# Patient Record
Sex: FEMALE | Race: WHITE | NOT HISPANIC OR LATINO | Employment: OTHER | URBAN - METROPOLITAN AREA
[De-identification: names, ages, dates, MRNs, and addresses within clinical notes are randomized per-mention and may not be internally consistent; named-entity substitution may affect disease eponyms.]

---

## 2017-01-06 ENCOUNTER — PATIENT MESSAGE (OUTPATIENT)
Dept: FAMILY MEDICINE | Facility: CLINIC | Age: 67
End: 2017-01-06

## 2017-01-06 RX ORDER — TRIAMCINOLONE ACETONIDE 5 MG/G
CREAM TOPICAL 2 TIMES DAILY
Qty: 15 G | Refills: 0 | Status: SHIPPED | OUTPATIENT
Start: 2017-01-06 | End: 2018-03-01 | Stop reason: SDUPTHER

## 2017-05-04 ENCOUNTER — PATIENT MESSAGE (OUTPATIENT)
Dept: FAMILY MEDICINE | Facility: CLINIC | Age: 67
End: 2017-05-04

## 2017-05-05 ENCOUNTER — OFFICE VISIT (OUTPATIENT)
Dept: FAMILY MEDICINE | Facility: CLINIC | Age: 67
End: 2017-05-05
Payer: MEDICARE

## 2017-05-05 VITALS
BODY MASS INDEX: 26.48 KG/M2 | WEIGHT: 158.94 LBS | TEMPERATURE: 98 F | RESPIRATION RATE: 12 BRPM | HEIGHT: 65 IN | HEART RATE: 92 BPM | SYSTOLIC BLOOD PRESSURE: 132 MMHG | DIASTOLIC BLOOD PRESSURE: 62 MMHG

## 2017-05-05 DIAGNOSIS — L25.9 CONTACT DERMATITIS, UNSPECIFIED CONTACT DERMATITIS TYPE, UNSPECIFIED TRIGGER: Primary | ICD-10-CM

## 2017-05-05 PROCEDURE — 96372 THER/PROPH/DIAG INJ SC/IM: CPT | Mod: S$GLB,,, | Performed by: NURSE PRACTITIONER

## 2017-05-05 PROCEDURE — 99214 OFFICE O/P EST MOD 30 MIN: CPT | Mod: 25,S$GLB,, | Performed by: NURSE PRACTITIONER

## 2017-05-05 RX ORDER — PREDNISONE 20 MG/1
40 TABLET ORAL DAILY
Qty: 20 TABLET | Refills: 0 | Status: SHIPPED | OUTPATIENT
Start: 2017-05-05 | End: 2017-05-15

## 2017-05-05 RX ORDER — DEXAMETHASONE SODIUM PHOSPHATE 4 MG/ML
8 INJECTION, SOLUTION INTRA-ARTICULAR; INTRALESIONAL; INTRAMUSCULAR; INTRAVENOUS; SOFT TISSUE
Status: COMPLETED | OUTPATIENT
Start: 2017-05-05 | End: 2017-05-05

## 2017-05-05 RX ADMIN — DEXAMETHASONE SODIUM PHOSPHATE 8 MG: 4 INJECTION, SOLUTION INTRA-ARTICULAR; INTRALESIONAL; INTRAMUSCULAR; INTRAVENOUS; SOFT TISSUE at 04:05

## 2017-05-05 NOTE — PATIENT INSTRUCTIONS
Poison Ivy Rash  You have a rash and itching. This is a delayed reaction to the oils of the poison ivy plant. You likely came in contact with it during the 3 days before your symptoms began. Your skin will become red and itchy. Small blisters may appear. These can break and leak a clear yellow fluid. This fluid is not contagious. The reaction usually starts to go away after 1 to 2 weeks. But it may take 4 to 6 weeks to fully clear.    Home care  Follow these guidelines when caring for yourself at home:  · The plant oils still on your skin or clothes can be spread to other places on your body. They can also be passed on to other people and cause a similar reaction. Thats why its important to wash all of the plant oils off your skin and any clothes that may have been exposed. Wash all clothes that you were wearing. Use hot water with ordinary laundry detergent.  · Don't use over-the-counter creams that have neomycin or bacitracin. These may make the rash worse.  · Avoid anything that heats up your skin. This includes hot showers or baths, or direct sunlight. These can make itching worse.  · Put a cold compress on areas that are leaking (weeping), or on blistered areas. Do this for 30 minutes 3 times a day. To make a cold compress, dip a wash cloth in a mixture of 1 pint of cold water and 1 packet of astringent or oatmeal bath powder. Keep the solution in the refrigerator for future use.  · If large areas of skin are affected, take a lukewarm bath. Add colloidal oatmeal, or 1 cup of cornstarch or baking soda to the water.  · For a rash in a smaller area, use hydrocortisone cream for redness and irritation. But dont use this if another medicine was prescribed. For severe itching, put an ice pack on the area. To make an ice pack, put ice cubes in a plastic bag that seals at the top. Wrap the bag in a clean, thin towel or cloth. Never put ice or an ice pack directly on the skin. Over-the-counter products that have  calamine lotion may also be helpful.  · You can also use an oral antihistamine medicine with diphenhydramine for itching, unless another medicine was prescribed. This medicine may make you sleepy. So use lower doses during the daytime and higher doses at bedtime. Dont use medicine that has diphenhydramine if you have glaucoma. Also dont use it if you are a man who has trouble urinating because of an enlarged prostate. Antihistamines with loratidine cause less drowsiness. They are a good choice for daytime use.  · For severe cases, your provider may prescribe oral steroid medicines. Always take these exactly as prescribed.  Follow-up care  Follow up with your healthcare provider, or as directed. Call your provider if your rash gets worse or you are not starting to get better after 1 week of treatment.  When to seek medical advice  Call your healthcare provider right away if any of these occur:  · Spreading facial rash with swollen mouth or eyelids  · Rash that spreads to the groin and causes swelling of the penis, scrotum, or vaginal area  · Trouble urinating because of swelling in the genital area  Also call your provider if you have signs of infection in the areas of broken blisters:  · Spreading redness  · Pus or fluid draining from the blisters  · Yellow-brown crusts form over the open blisters  · Fever of 1 degree, or higher, above your normal temperature, or as directed by your provider  Call 911  Call 911 if you have severe swelling on your face, eyelids, mouth, throat, or tongue.  Date Last Reviewed: 8/1/2016  © 8128-1574 [employer]. [address] [place] [address], [place] [address]. All rights reserved. This information is not intended as a substitute for professional medical care. Always follow your healthcare professional's instructions.

## 2017-05-05 NOTE — MR AVS SNAPSHOT
[facility]  [address]  [facility] [address]  Phone: [phone]  Fax: [phone]                  Kadi Blackman   2017 3:20 PM   Office Visit    Description:  Female : 1950   Provider:  Bella Molina NP   Department:  [facility]           Reason for Visit     itching rash, red all over body           Diagnoses this Visit        Comments    Contact dermatitis, unspecified contact dermatitis type, unspecified trigger    -  Primary            To Do List           Goals (5 Years of Data)     None       These Medications        Disp Refills Start End    predniSONE (DELTASONE) 20 MG tablet 20 tablet 0 2017 5/15/2017    Take 2 tablets (40 mg total) by mouth once daily. - Oral    [id number] [place] [facility] [address] #: [phone]         [facility]     [facility] Nurse Care Line -  Assistance  Unless otherwise directed by your provider, please contact [facility], our nurse care line that is available for  assistance.     Registered nurses in the [facility] Center provide: appointment scheduling, clinical advisement, health education, and other advisory services.  Call: [phone] (toll free)               Medications           Message regarding Medications     Verify the changes and/or additions to your medication regime listed below are the same as discussed with your clinician today.  If any of these changes or additions are incorrect, please notify your healthcare provider.        START taking these NEW medications        Refills    predniSONE (DELTASONE) 20 MG tablet 0    Sig: Take 2 tablets (40 mg total) by mouth once daily.    Class: Normal    Route: Oral      These medications were administered today        Dose Freq    dexamethasone injection 8 mg 8 mg Clinic/HOD 1 time    Sig: Inject 2 mLs (8 mg total) into the muscle one time.    Class: Normal     "Route: Intramuscular           Verify that the below list of medications is an accurate representation of the medications you are currently taking.  If none reported, the list may be blank. If incorrect, please contact your healthcare provider. Carry this list with you in case of emergency.           Current Medications     diphenhydrAMINE (BENADRYL) 50 MG tablet Take 50 mg by mouth nightly as needed for Itching.    fluoxetine (PROZAC) 20 MG capsule Take 1 capsule (20 mg total) by mouth once daily.    levothyroxine (SYNTHROID, LEVOTHROID) 175 MCG tablet Take 1 tablet (175 mcg total) by mouth every morning.    predniSONE (DELTASONE) 20 MG tablet Take 2 tablets (40 mg total) by mouth once daily.    triamcinolone acetonide 0.5% (KENALOG) 0.5 % Crea Apply topically 2 (two) times daily.           Clinical Reference Information           Your Vitals Were     BP Pulse Temp Resp Height Weight    132/62 92 98.3 °F (36.8 °C) (Oral) 12 5' 5" (1.651 m) 72.1 kg (158 lb 15.2 oz)    BMI                26.45 kg/m2          Blood Pressure          Most Recent Value    BP  132/62      Allergies as of 5/5/2017     No Known Allergies      Immunizations Administered on Date of Encounter - 5/5/2017     None      Instructions      Poison Coral Rash  You have a rash and itching. This is a delayed reaction to the oils of the poison ivy plant. You likely came in contact with it during the 3 days before your symptoms began. Your skin will become red and itchy. Small blisters may appear. These can break and leak a clear yellow fluid. This fluid is not contagious. The reaction usually starts to go away after 1 to 2 weeks. But it may take 4 to 6 weeks to fully clear.    Home care  Follow these guidelines when caring for yourself at home:  · The plant oils still on your skin or clothes can be spread to other places on your body. They can also be passed on to other people and cause a similar reaction. Thats why its important to wash all of the " plant oils off your skin and any clothes that may have been exposed. Wash all clothes that you were wearing. Use hot water with ordinary laundry detergent.  · Don't use over-the-counter creams that have neomycin or bacitracin. These may make the rash worse.  · Avoid anything that heats up your skin. This includes hot showers or baths, or direct sunlight. These can make itching worse.  · Put a cold compress on areas that are leaking (weeping), or on blistered areas. Do this for 30 minutes 3 times a day. To make a cold compress, dip a wash cloth in a mixture of 1 pint of cold water and 1 packet of astringent or oatmeal bath powder. Keep the solution in the refrigerator for future use.  · If large areas of skin are affected, take a lukewarm bath. Add colloidal oatmeal, or 1 cup of cornstarch or baking soda to the water.  · For a rash in a smaller area, use hydrocortisone cream for redness and irritation. But dont use this if another medicine was prescribed. For severe itching, put an ice pack on the area. To make an ice pack, put ice cubes in a plastic bag that seals at the top. Wrap the bag in a clean, thin towel or cloth. Never put ice or an ice pack directly on the skin. Over-the-counter products that have calamine lotion may also be helpful.  · You can also use an oral antihistamine medicine with diphenhydramine for itching, unless another medicine was prescribed. This medicine may make you sleepy. So use lower doses during the daytime and higher doses at bedtime. Dont use medicine that has diphenhydramine if you have glaucoma. Also dont use it if you are a man who has trouble urinating because of an enlarged prostate. Antihistamines with loratidine cause less drowsiness. They are a good choice for daytime use.  · For severe cases, your provider may prescribe oral steroid medicines. Always take these exactly as prescribed.  Follow-up care  Follow up with your healthcare provider, or as directed. Call your  provider if your rash gets worse or you are not starting to get better after 1 week of treatment.  When to seek medical advice  Call your healthcare provider right away if any of these occur:  · Spreading facial rash with swollen mouth or eyelids  · Rash that spreads to the groin and causes swelling of the penis, scrotum, or vaginal area  · Trouble urinating because of swelling in the genital area  Also call your provider if you have signs of infection in the areas of broken blisters:  · Spreading redness  · Pus or fluid draining from the blisters  · Yellow-brown crusts form over the open blisters  · Fever of 1 degree, or higher, above your normal temperature, or as directed by your provider  Call 911  Call 911 if you have severe swelling on your face, eyelids, mouth, throat, or tongue.  Date Last Reviewed: 8/1/2016 © 2000-2016 [employer]. [address] [place] [address]. All rights reserved. This information is not intended as a substitute for professional medical care. Always follow your healthcare professional's instructions.             Language Assistance Services     ATTENTION: Language assistance services are available, free of charge. Please call [phone].      ATENCIÓN: Si habla español, tiene a hansen disposición servicios gratuitos de asistencia lingüística. Llame al [phone].     CHÚ Ý: N?u b?n nói Ti?ng Vi?t, có các d?ch v? h? tr? ngôn ng? mi?n phí dành cho b?n. G?i s? [phone].         [facility] complies with applicable Federal civil rights laws and does not discriminate on the basis of race, color, national origin, age, disability, or sex.

## 2017-05-07 NOTE — PROGRESS NOTES
"Subjective:       Patient ID: Kadi Blackman is a 67 y.o. female.    Chief Complaint: itching rash, red all over body    Rash   This is a new problem. The current episode started in the past 7 days. The affected locations include the chest (upper extremities). The rash is characterized by blistering and redness. She was exposed to nothing. Pertinent negatives include no anorexia, congestion, cough, diarrhea, eye pain, facial edema, fatigue, fever, joint pain, nail changes, rhinorrhea, shortness of breath, sore throat or vomiting. Past treatments include anti-itch cream. The treatment provided no relief.     Review of Systems   Constitutional: Negative for fatigue and fever.   HENT: Negative for congestion, rhinorrhea and sore throat.    Eyes: Negative for pain.   Respiratory: Negative for cough and shortness of breath.    Gastrointestinal: Negative for anorexia, diarrhea and vomiting.   Musculoskeletal: Negative for joint pain.   Skin: Positive for rash. Negative for nail changes.       Objective:       Vitals:    05/05/17 1533   BP: 132/62   Pulse: 92   Resp: 12   Temp: 98.3 °F (36.8 °C)   TempSrc: Oral   Weight: 72.1 kg (158 lb 15.2 oz)   Height: 5' 5" (1.651 m)   PainSc: 0-No pain   PainLoc: Generalized       Physical Exam   Constitutional: She is oriented to person, place, and time. She appears well-developed and well-nourished.   HENT:   Head: Normocephalic and atraumatic.   Right Ear: External ear normal.   Left Ear: External ear normal.   Nose: Nose normal.   Mouth/Throat: Oropharynx is clear and moist.   Eyes: Conjunctivae are normal. Right eye exhibits no discharge. Left eye exhibits no discharge. No scleral icterus.   Neck: Normal range of motion. Neck supple. No tracheal deviation present.   Cardiovascular: Normal rate, regular rhythm and normal heart sounds.  Exam reveals no friction rub.    No murmur heard.  Pulmonary/Chest: Effort normal and breath sounds normal. No stridor. No respiratory " distress. She has no wheezes. She has no rales. She exhibits no tenderness.   Musculoskeletal: Normal range of motion.   Lymphadenopathy:     She has no cervical adenopathy.   Neurological: She is alert and oriented to person, place, and time.   Skin: Skin is warm and dry.   There are scattered erythematous papules to the arms/chest. The arms have severe erythema from the wrist to the upper arm.    Psychiatric: She has a normal mood and affect.       Assessment:       1. Contact dermatitis, unspecified contact dermatitis type, unspecified trigger        Plan:       Kadi was seen today for itching rash, red all over body.    Diagnoses and all orders for this visit:    Contact dermatitis, unspecified contact dermatitis type, unspecified trigger  -     dexamethasone injection 8 mg; Inject 2 mLs (8 mg total) into the muscle one time.  -     predniSONE (DELTASONE) 20 MG tablet; Take 2 tablets (40 mg total) by mouth once daily.

## 2017-05-30 ENCOUNTER — PATIENT MESSAGE (OUTPATIENT)
Dept: FAMILY MEDICINE | Facility: CLINIC | Age: 67
End: 2017-05-30

## 2017-09-05 ENCOUNTER — PATIENT MESSAGE (OUTPATIENT)
Dept: FAMILY MEDICINE | Facility: CLINIC | Age: 67
End: 2017-09-05

## 2017-09-05 RX ORDER — LEVOTHYROXINE SODIUM 175 UG/1
175 TABLET ORAL EVERY MORNING
Qty: 90 TABLET | Refills: 3 | Status: SHIPPED | OUTPATIENT
Start: 2017-09-05 | End: 2018-10-10

## 2017-09-29 DIAGNOSIS — Z12.11 COLON CANCER SCREENING: ICD-10-CM

## 2017-10-04 ENCOUNTER — CLINICAL SUPPORT (OUTPATIENT)
Dept: FAMILY MEDICINE | Facility: CLINIC | Age: 67
End: 2017-10-04
Payer: MEDICARE

## 2018-03-01 RX ORDER — TRIAMCINOLONE ACETONIDE 5 MG/G
CREAM TOPICAL 2 TIMES DAILY
Qty: 15 G | Refills: 0 | Status: SHIPPED | OUTPATIENT
Start: 2018-03-01 | End: 2018-10-09 | Stop reason: SDUPTHER

## 2018-05-09 ENCOUNTER — PATIENT OUTREACH (OUTPATIENT)
Dept: ADMINISTRATIVE | Facility: HOSPITAL | Age: 68
End: 2018-05-09

## 2018-05-09 NOTE — PROGRESS NOTES
Health Maintenance Due   Topic Date Due    Colonoscopy  01/16/1968    Lipid Panel  01/19/2017    Mammogram  01/20/2017

## 2018-09-12 ENCOUNTER — PATIENT MESSAGE (OUTPATIENT)
Dept: FAMILY MEDICINE | Facility: CLINIC | Age: 68
End: 2018-09-12

## 2018-09-12 DIAGNOSIS — E03.9 HYPOTHYROIDISM, UNSPECIFIED TYPE: ICD-10-CM

## 2018-09-12 DIAGNOSIS — E78.5 HYPERLIPIDEMIA, UNSPECIFIED HYPERLIPIDEMIA TYPE: ICD-10-CM

## 2018-09-12 DIAGNOSIS — Z00.00 PREVENTATIVE HEALTH CARE: Primary | ICD-10-CM

## 2018-10-02 DIAGNOSIS — Z12.11 COLON CANCER SCREENING: ICD-10-CM

## 2018-10-08 ENCOUNTER — LAB VISIT (OUTPATIENT)
Dept: LAB | Facility: HOSPITAL | Age: 68
End: 2018-10-08
Attending: NURSE PRACTITIONER
Payer: MEDICARE

## 2018-10-08 DIAGNOSIS — Z00.00 PREVENTATIVE HEALTH CARE: ICD-10-CM

## 2018-10-08 DIAGNOSIS — E78.5 HYPERLIPIDEMIA, UNSPECIFIED HYPERLIPIDEMIA TYPE: ICD-10-CM

## 2018-10-08 DIAGNOSIS — E03.9 HYPOTHYROIDISM, UNSPECIFIED TYPE: ICD-10-CM

## 2018-10-08 LAB
ALBUMIN SERPL BCP-MCNC: 4 G/DL
ALP SERPL-CCNC: 93 U/L
ALT SERPL W/O P-5'-P-CCNC: 21 U/L
ANION GAP SERPL CALC-SCNC: 8 MMOL/L
AST SERPL-CCNC: 32 U/L
BILIRUB SERPL-MCNC: 0.5 MG/DL
BUN SERPL-MCNC: 8 MG/DL
CALCIUM SERPL-MCNC: 10 MG/DL
CHLORIDE SERPL-SCNC: 105 MMOL/L
CHOLEST SERPL-MCNC: 223 MG/DL
CHOLEST/HDLC SERPL: 3.1 {RATIO}
CO2 SERPL-SCNC: 26 MMOL/L
CREAT SERPL-MCNC: 0.8 MG/DL
EST. GFR  (AFRICAN AMERICAN): >60 ML/MIN/1.73 M^2
EST. GFR  (NON AFRICAN AMERICAN): >60 ML/MIN/1.73 M^2
GLUCOSE SERPL-MCNC: 86 MG/DL
HDLC SERPL-MCNC: 72 MG/DL
HDLC SERPL: 32.3 %
LDLC SERPL CALC-MCNC: 137.8 MG/DL
NONHDLC SERPL-MCNC: 151 MG/DL
POTASSIUM SERPL-SCNC: 4.4 MMOL/L
PROT SERPL-MCNC: 7.2 G/DL
SODIUM SERPL-SCNC: 139 MMOL/L
T4 FREE SERPL-MCNC: 1.25 NG/DL
TRIGL SERPL-MCNC: 66 MG/DL
TSH SERPL DL<=0.005 MIU/L-ACNC: 0.02 UIU/ML

## 2018-10-08 PROCEDURE — 84443 ASSAY THYROID STIM HORMONE: CPT

## 2018-10-08 PROCEDURE — 36415 COLL VENOUS BLD VENIPUNCTURE: CPT | Mod: PN

## 2018-10-08 PROCEDURE — 80061 LIPID PANEL: CPT

## 2018-10-08 PROCEDURE — 80053 COMPREHEN METABOLIC PANEL: CPT

## 2018-10-08 PROCEDURE — 84439 ASSAY OF FREE THYROXINE: CPT

## 2018-10-09 ENCOUNTER — PATIENT MESSAGE (OUTPATIENT)
Dept: FAMILY MEDICINE | Facility: CLINIC | Age: 68
End: 2018-10-09

## 2018-10-09 RX ORDER — LEVOTHYROXINE SODIUM 175 UG/1
175 TABLET ORAL EVERY MORNING
Qty: 90 TABLET | Refills: 3 | Status: CANCELLED | OUTPATIENT
Start: 2018-10-09

## 2018-10-10 RX ORDER — LEVOTHYROXINE SODIUM 150 UG/1
150 TABLET ORAL DAILY
Qty: 90 TABLET | Refills: 0 | Status: SHIPPED | OUTPATIENT
Start: 2018-10-10 | End: 2019-01-05 | Stop reason: SDUPTHER

## 2018-10-10 RX ORDER — TRIAMCINOLONE ACETONIDE 5 MG/G
CREAM TOPICAL 2 TIMES DAILY
Qty: 15 G | Refills: 0 | Status: SHIPPED | OUTPATIENT
Start: 2018-10-10 | End: 2019-03-16 | Stop reason: SDUPTHER

## 2018-10-10 NOTE — TELEPHONE ENCOUNTER
Her TSH was actually low which shows an overactive thyroid.  I have reduced her Synthroid to 150 mcg.  She should follow up with her new provider to have a TSH in the next 6 weeks.

## 2019-01-02 ENCOUNTER — PATIENT MESSAGE (OUTPATIENT)
Dept: ADMINISTRATIVE | Facility: HOSPITAL | Age: 69
End: 2019-01-02

## 2019-01-02 ENCOUNTER — PATIENT OUTREACH (OUTPATIENT)
Dept: ADMINISTRATIVE | Facility: HOSPITAL | Age: 69
End: 2019-01-02

## 2019-01-05 RX ORDER — LEVOTHYROXINE SODIUM 150 UG/1
150 TABLET ORAL DAILY
Qty: 90 TABLET | Refills: 0 | Status: SHIPPED | OUTPATIENT
Start: 2019-01-05 | End: 2019-04-05 | Stop reason: SDUPTHER

## 2019-01-31 ENCOUNTER — TELEPHONE (OUTPATIENT)
Dept: FAMILY MEDICINE | Facility: CLINIC | Age: 69
End: 2019-01-31

## 2019-01-31 NOTE — TELEPHONE ENCOUNTER
Called to advise patient need to reschedule,lmor.  done in error. Called back to advise of my mistake, left another voicemail

## 2019-02-07 ENCOUNTER — OFFICE VISIT (OUTPATIENT)
Dept: FAMILY MEDICINE | Facility: CLINIC | Age: 69
End: 2019-02-07
Payer: MEDICARE

## 2019-02-07 ENCOUNTER — LAB VISIT (OUTPATIENT)
Dept: LAB | Facility: HOSPITAL | Age: 69
End: 2019-02-07
Attending: INTERNAL MEDICINE
Payer: MEDICARE

## 2019-02-07 VITALS
BODY MASS INDEX: 26.7 KG/M2 | SYSTOLIC BLOOD PRESSURE: 120 MMHG | DIASTOLIC BLOOD PRESSURE: 70 MMHG | HEART RATE: 76 BPM | WEIGHT: 160.25 LBS | HEIGHT: 65 IN

## 2019-02-07 DIAGNOSIS — F32.A DEPRESSION, UNSPECIFIED DEPRESSION TYPE: ICD-10-CM

## 2019-02-07 DIAGNOSIS — E03.9 HYPOTHYROIDISM, UNSPECIFIED TYPE: ICD-10-CM

## 2019-02-07 DIAGNOSIS — Z12.39 SCREENING FOR MALIGNANT NEOPLASM OF BREAST: ICD-10-CM

## 2019-02-07 DIAGNOSIS — M85.80 OSTEOPENIA, UNSPECIFIED LOCATION: ICD-10-CM

## 2019-02-07 DIAGNOSIS — E78.5 HYPERLIPIDEMIA, UNSPECIFIED HYPERLIPIDEMIA TYPE: ICD-10-CM

## 2019-02-07 DIAGNOSIS — E03.9 HYPOTHYROIDISM, UNSPECIFIED TYPE: Primary | ICD-10-CM

## 2019-02-07 LAB
T4 FREE SERPL-MCNC: 1.48 NG/DL
TSH SERPL DL<=0.005 MIU/L-ACNC: 0.04 UIU/ML

## 2019-02-07 PROCEDURE — 99213 OFFICE O/P EST LOW 20 MIN: CPT | Mod: PBBFAC,PN | Performed by: INTERNAL MEDICINE

## 2019-02-07 PROCEDURE — 84443 ASSAY THYROID STIM HORMONE: CPT

## 2019-02-07 PROCEDURE — 99214 PR OFFICE/OUTPT VISIT, EST, LEVL IV, 30-39 MIN: ICD-10-PCS | Mod: S$PBB,,, | Performed by: INTERNAL MEDICINE

## 2019-02-07 PROCEDURE — 99999 PR PBB SHADOW E&M-EST. PATIENT-LVL III: CPT | Mod: PBBFAC,,, | Performed by: INTERNAL MEDICINE

## 2019-02-07 PROCEDURE — 99999 PR PBB SHADOW E&M-EST. PATIENT-LVL III: ICD-10-PCS | Mod: PBBFAC,,, | Performed by: INTERNAL MEDICINE

## 2019-02-07 PROCEDURE — 36415 COLL VENOUS BLD VENIPUNCTURE: CPT | Mod: PN

## 2019-02-07 PROCEDURE — 84439 ASSAY OF FREE THYROXINE: CPT

## 2019-02-07 PROCEDURE — 99214 OFFICE O/P EST MOD 30 MIN: CPT | Mod: S$PBB,,, | Performed by: INTERNAL MEDICINE

## 2019-02-07 NOTE — PROGRESS NOTES
Assessment and Plan:    1. Hypothyroidism, unspecified type  Due for follow up labs since dose of levothyroxine decreased from 175 mcg to 150 mcg. No symptoms at this time.   - TSH; Future  - T4, free; Future    2. Depression, unspecified depression type  Mood has been good lately. Had been on fluoxetine in the past and no problems with this. Not on medication currently.     3. Hyperlipidemia, unspecified hyperlipidemia type  Overall low risk of heart attack and stroke. Continue to monitor, no medications indicated at this time.     4. Osteopenia, unspecified location  Osteopenia with low risk of fracture on last DEXA. Discussed eating foods rich in calcium and considering vitamin D supplement. Increase weight bearing activity.     5. Screening for malignant neoplasm of breast  - Mammo Digital Screening Bilat; Future      Colon cancer screening- Has FitKit at home, reminded to complete this and bring it in.   Mammo- ordered  Flu- declined  ______________________________________________________________________  Subjective:    Chief Complaint:  Establish care, review chronic medical conditions    HPI:  Kadi is a 69 y.o. year old woman here to establish care and review chronic medical conditions.     Hypothyroidism- Taking levothyroxine 150 mcg daily since labs in October when this was lowered from 175 mcg.     Depression- Was fluoxetine 20 mg daily in the past but has not been on this for more than a year. Mood has been fine without this.     HLD- Has never been on medications for this, labs have been borderline. Improved last year, but patient is not sure what would have changes. She has not changed her diet or been exercising more in particular. She has always not really eaten meat, eats more of a plant based diet (vegan). Has not been exercising much.     Osteopenia- Osteopenia with low risk of fracture noted on previous DEXA.     Past Medical History:  Past Medical History:   Diagnosis Date    Depression      Hyperlipidemia     Osteopenia     Thyroid disease        Past Surgical History:  Past Surgical History:   Procedure Laterality Date    NO PAST SURGERIES         Family History:  Family History   Problem Relation Age of Onset    Cancer Mother         stomach    Cancer Sister         ovarian    Hyperlipidemia Father     Hyperlipidemia Brother     Heart disease Paternal Grandfather     Diabetes Neg Hx        Social History:  Social History     Socioeconomic History    Marital status: Single     Spouse name: None    Number of children: None    Years of education: None    Highest education level: None   Social Needs    Financial resource strain: None    Food insecurity - worry: None    Food insecurity - inability: None    Transportation needs - medical: None    Transportation needs - non-medical: None   Occupational History    None   Tobacco Use    Smoking status: Former Smoker    Smokeless tobacco: Never Used    Tobacco comment: very light smoker in her teens   Substance and Sexual Activity    Alcohol use: No    Drug use: No     Comment: Former light MJ user    Sexual activity: Not Currently   Other Topics Concern    None   Social History Narrative    Retired, former . Still involved with a small photography business.        Medications:  Current Outpatient Medications on File Prior to Visit   Medication Sig Dispense Refill    diphenhydrAMINE (BENADRYL) 50 MG tablet Take 50 mg by mouth nightly as needed for Itching.      levothyroxine (SYNTHROID) 150 MCG tablet TAKE 1 TABLET (150 MCG TOTAL) BY MOUTH ONCE DAILY. 90 tablet 0    triamcinolone acetonide 0.5% (KENALOG) 0.5 % Crea Apply topically 2 (two) times daily. for 10 days 15 g 0    [DISCONTINUED] fluoxetine (PROZAC) 20 MG capsule Take 1 capsule (20 mg total) by mouth once daily. 90 capsule 3     No current facility-administered medications on file prior to visit.        Allergies:  Patient has no known  "allergies.    Immunizations:  Immunization History   Administered Date(s) Administered    Pneumococcal Conjugate - 13 Valent 01/26/2016    Pneumococcal Polysaccharide - 23 Valent 07/28/2014    Tdap 01/26/2016    Zoster 09/04/2014       Review of Systems:  Review of Systems   Constitutional: Negative for activity change and unexpected weight change.   HENT: Negative for hearing loss, rhinorrhea and trouble swallowing.    Eyes: Negative for discharge and visual disturbance.   Respiratory: Negative for chest tightness and wheezing.    Cardiovascular: Negative for chest pain and palpitations.   Gastrointestinal: Negative for blood in stool, constipation, diarrhea and vomiting.   Endocrine: Negative for polydipsia and polyuria.   Genitourinary: Negative for difficulty urinating, dysuria, hematuria and menstrual problem.   Musculoskeletal: Negative for arthralgias, joint swelling and neck pain.   Neurological: Negative for weakness and headaches.   Psychiatric/Behavioral: Negative for confusion and dysphoric mood.     Entered by patient and reviewed and updated during visit      Objective:    Vitals:  Vitals:    02/07/19 1326   BP: 120/70   Pulse: 76   Weight: 72.7 kg (160 lb 4.4 oz)   Height: 5' 5" (1.651 m)   PainSc: 0-No pain       Physical Exam   Constitutional: She is oriented to person, place, and time. She appears well-developed and well-nourished. No distress.   HENT:   Mouth/Throat: Oropharynx is clear and moist.   Eyes: No scleral icterus.   Cardiovascular: Normal rate and regular rhythm.   No murmur heard.  Pulmonary/Chest: Effort normal and breath sounds normal. No respiratory distress. She has no wheezes.   Musculoskeletal: She exhibits no edema.   Neurological: She is alert and oriented to person, place, and time.   Skin: Skin is warm and dry.   some hemosiderin deposition over bilateral ankles   Psychiatric: She has a normal mood and affect. Her behavior is normal.   Vitals " reviewed.      Data:  Previous labs reviewed and pertinent for low TSH with normal FT4 in October.        Saskia Cheema MD  Internal Medicine

## 2019-03-16 RX ORDER — TRIAMCINOLONE ACETONIDE 5 MG/G
CREAM TOPICAL 2 TIMES DAILY
Qty: 15 G | Refills: 0 | Status: SHIPPED | OUTPATIENT
Start: 2019-03-16 | End: 2020-07-07

## 2019-04-05 RX ORDER — LEVOTHYROXINE SODIUM 150 UG/1
150 TABLET ORAL DAILY
Qty: 90 TABLET | Refills: 0 | Status: SHIPPED | OUTPATIENT
Start: 2019-04-05 | End: 2019-04-22 | Stop reason: SDUPTHER

## 2019-04-10 ENCOUNTER — PATIENT OUTREACH (OUTPATIENT)
Dept: ADMINISTRATIVE | Facility: HOSPITAL | Age: 69
End: 2019-04-10

## 2019-04-20 ENCOUNTER — PATIENT MESSAGE (OUTPATIENT)
Dept: FAMILY MEDICINE | Facility: CLINIC | Age: 69
End: 2019-04-20

## 2019-04-22 DIAGNOSIS — E03.9 HYPOTHYROIDISM, UNSPECIFIED TYPE: Primary | ICD-10-CM

## 2019-04-22 RX ORDER — LEVOTHYROXINE SODIUM 150 UG/1
150 TABLET ORAL DAILY
Qty: 90 TABLET | Refills: 0 | Status: SHIPPED | OUTPATIENT
Start: 2019-04-22 | End: 2019-06-14 | Stop reason: ALTCHOICE

## 2019-04-22 NOTE — TELEPHONE ENCOUNTER
"Called and spoke with pt. Pt was notified of rx refill and that she was due for repeat labs. Pt stated "I have a lot going on right now, because I am going on vacation. I will call back and schedule my labs." I voiced understanding.   "

## 2019-06-13 ENCOUNTER — LAB VISIT (OUTPATIENT)
Dept: LAB | Facility: HOSPITAL | Age: 69
End: 2019-06-13
Attending: INTERNAL MEDICINE
Payer: MEDICARE

## 2019-06-13 DIAGNOSIS — Z12.39 SCREENING FOR MALIGNANT NEOPLASM OF BREAST: ICD-10-CM

## 2019-06-13 DIAGNOSIS — Z78.0 ASYMPTOMATIC POSTMENOPAUSAL STATE: Primary | ICD-10-CM

## 2019-06-13 DIAGNOSIS — E03.9 HYPOTHYROIDISM, UNSPECIFIED TYPE: ICD-10-CM

## 2019-06-13 LAB
T4 FREE SERPL-MCNC: 1.27 NG/DL (ref 0.71–1.51)
TSH SERPL DL<=0.005 MIU/L-ACNC: 0.05 UIU/ML (ref 0.4–4)

## 2019-06-13 PROCEDURE — 84439 ASSAY OF FREE THYROXINE: CPT

## 2019-06-13 PROCEDURE — 84443 ASSAY THYROID STIM HORMONE: CPT

## 2019-06-13 PROCEDURE — 36415 COLL VENOUS BLD VENIPUNCTURE: CPT | Mod: PN

## 2019-06-14 ENCOUNTER — TELEPHONE (OUTPATIENT)
Dept: FAMILY MEDICINE | Facility: CLINIC | Age: 69
End: 2019-06-14

## 2019-06-14 DIAGNOSIS — E03.9 HYPOTHYROIDISM, UNSPECIFIED TYPE: Primary | ICD-10-CM

## 2019-06-14 RX ORDER — LEVOTHYROXINE SODIUM 137 UG/1
137 TABLET ORAL
Qty: 30 TABLET | Refills: 2 | Status: SHIPPED | OUTPATIENT
Start: 2019-06-14 | End: 2019-09-13 | Stop reason: SDUPTHER

## 2019-06-14 NOTE — TELEPHONE ENCOUNTER
Please call patient about thyroid labs. The labs show that the levothyroxine dose is still too high. Ideally, I would like to decrease the dose to 137 mcg. Please confirm allergies and pharmacy and send back to me.

## 2019-09-13 DIAGNOSIS — E03.9 HYPOTHYROIDISM, UNSPECIFIED TYPE: ICD-10-CM

## 2019-09-13 RX ORDER — LEVOTHYROXINE SODIUM 137 UG/1
TABLET ORAL
Qty: 30 TABLET | Refills: 2 | Status: SHIPPED | OUTPATIENT
Start: 2019-09-13 | End: 2020-01-13 | Stop reason: SDUPTHER

## 2019-09-13 NOTE — TELEPHONE ENCOUNTER
Please call patient to schedule recheck TSH (previously ordered) so we can make sure this is the right dose for her.

## 2019-09-16 ENCOUNTER — PATIENT MESSAGE (OUTPATIENT)
Dept: FAMILY MEDICINE | Facility: CLINIC | Age: 69
End: 2019-09-16

## 2019-10-07 DIAGNOSIS — Z12.11 COLON CANCER SCREENING: ICD-10-CM

## 2019-12-10 DIAGNOSIS — E03.9 HYPOTHYROIDISM, UNSPECIFIED TYPE: ICD-10-CM

## 2019-12-10 NOTE — TELEPHONE ENCOUNTER
Called pt in regards her labs and a follow up appt. No answer. LVM to return phone call to clinic.

## 2019-12-10 NOTE — TELEPHONE ENCOUNTER
Please call patient. She is due for repeat TSH and did not read the portal message she had been sent about this in Sept.

## 2019-12-13 NOTE — TELEPHONE ENCOUNTER
2nd attempt to contact pt. No answer. LVM to return phone call to clinic to be scheduled for labs and follow up appt.

## 2019-12-18 RX ORDER — LEVOTHYROXINE SODIUM 137 UG/1
TABLET ORAL
Qty: 30 TABLET | Refills: 2 | OUTPATIENT
Start: 2019-12-18

## 2020-01-06 ENCOUNTER — PATIENT MESSAGE (OUTPATIENT)
Dept: FAMILY MEDICINE | Facility: CLINIC | Age: 70
End: 2020-01-06

## 2020-01-10 ENCOUNTER — LAB VISIT (OUTPATIENT)
Dept: LAB | Facility: HOSPITAL | Age: 70
End: 2020-01-10
Attending: INTERNAL MEDICINE
Payer: MEDICARE

## 2020-01-10 ENCOUNTER — PATIENT MESSAGE (OUTPATIENT)
Dept: FAMILY MEDICINE | Facility: CLINIC | Age: 70
End: 2020-01-10

## 2020-01-10 DIAGNOSIS — E03.9 HYPOTHYROIDISM, UNSPECIFIED TYPE: ICD-10-CM

## 2020-01-10 PROCEDURE — 36415 COLL VENOUS BLD VENIPUNCTURE: CPT | Mod: PN

## 2020-01-10 PROCEDURE — 84443 ASSAY THYROID STIM HORMONE: CPT

## 2020-01-11 LAB — TSH SERPL DL<=0.005 MIU/L-ACNC: 0.98 UIU/ML (ref 0.4–4)

## 2020-01-13 ENCOUNTER — TELEPHONE (OUTPATIENT)
Dept: FAMILY MEDICINE | Facility: CLINIC | Age: 70
End: 2020-01-13

## 2020-01-13 DIAGNOSIS — E03.9 HYPOTHYROIDISM, UNSPECIFIED TYPE: ICD-10-CM

## 2020-01-13 RX ORDER — LEVOTHYROXINE SODIUM 137 UG/1
137 TABLET ORAL
Qty: 90 TABLET | Refills: 2 | Status: SHIPPED | OUTPATIENT
Start: 2020-01-13 | End: 2020-09-20

## 2020-01-13 NOTE — TELEPHONE ENCOUNTER
Thyroid lab is in the normal range, please let her know that I have sent the refills to her preferred pharmacy.

## 2020-07-07 ENCOUNTER — OFFICE VISIT (OUTPATIENT)
Dept: FAMILY MEDICINE | Facility: CLINIC | Age: 70
End: 2020-07-07
Payer: MEDICARE

## 2020-07-07 VITALS
RESPIRATION RATE: 18 BRPM | DIASTOLIC BLOOD PRESSURE: 84 MMHG | SYSTOLIC BLOOD PRESSURE: 136 MMHG | WEIGHT: 144.31 LBS | OXYGEN SATURATION: 99 % | TEMPERATURE: 98 F | BODY MASS INDEX: 24.04 KG/M2 | HEART RATE: 76 BPM | HEIGHT: 65 IN

## 2020-07-07 DIAGNOSIS — L23.7 ALLERGIC DERMATITIS DUE TO POISON IVY: Primary | ICD-10-CM

## 2020-07-07 PROCEDURE — 99999 PR PBB SHADOW E&M-EST. PATIENT-LVL V: CPT | Mod: PBBFAC,,, | Performed by: INTERNAL MEDICINE

## 2020-07-07 PROCEDURE — 96372 THER/PROPH/DIAG INJ SC/IM: CPT | Mod: PBBFAC,PN

## 2020-07-07 PROCEDURE — 99214 PR OFFICE/OUTPT VISIT, EST, LEVL IV, 30-39 MIN: ICD-10-PCS | Mod: S$PBB,,, | Performed by: INTERNAL MEDICINE

## 2020-07-07 PROCEDURE — 99214 OFFICE O/P EST MOD 30 MIN: CPT | Mod: S$PBB,,, | Performed by: INTERNAL MEDICINE

## 2020-07-07 PROCEDURE — 99215 OFFICE O/P EST HI 40 MIN: CPT | Mod: PBBFAC,PN | Performed by: INTERNAL MEDICINE

## 2020-07-07 PROCEDURE — 99999 PR PBB SHADOW E&M-EST. PATIENT-LVL V: ICD-10-PCS | Mod: PBBFAC,,, | Performed by: INTERNAL MEDICINE

## 2020-07-07 RX ORDER — HYDROXYZINE HYDROCHLORIDE 25 MG/1
25-50 TABLET, FILM COATED ORAL 3 TIMES DAILY PRN
Qty: 30 TABLET | Refills: 0 | Status: SHIPPED | OUTPATIENT
Start: 2020-07-07 | End: 2021-08-10

## 2020-07-07 RX ORDER — PREDNISONE 10 MG/1
TABLET ORAL
Qty: 31 TABLET | Refills: 0 | Status: SHIPPED | OUTPATIENT
Start: 2020-07-07 | End: 2021-08-10

## 2020-07-07 RX ORDER — TRIAMCINOLONE ACETONIDE 1 MG/G
OINTMENT TOPICAL 2 TIMES DAILY
Qty: 30 G | Refills: 2 | Status: SHIPPED | OUTPATIENT
Start: 2020-07-07 | End: 2022-08-26

## 2020-07-07 RX ORDER — BETAMETHASONE SODIUM PHOSPHATE AND BETAMETHASONE ACETATE 3; 3 MG/ML; MG/ML
6 INJECTION, SUSPENSION INTRA-ARTICULAR; INTRALESIONAL; INTRAMUSCULAR; SOFT TISSUE ONCE
Status: COMPLETED | OUTPATIENT
Start: 2020-07-07 | End: 2020-07-07

## 2020-07-07 RX ADMIN — BETAMETHASONE ACETATE AND BETAMETHASONE SODIUM PHOSPHATE 6 MG: 3; 3 INJECTION, SUSPENSION INTRA-ARTICULAR; INTRALESIONAL; INTRAMUSCULAR; SOFT TISSUE at 02:07

## 2020-07-07 NOTE — PATIENT INSTRUCTIONS
Prednisone:    Day 1: 40 mg  Day 2: 40 mg  Day 3: 40 mg  Day 4: 30 mg  Day 5: 30 mg  Day 6: 30 mg  Day 7: 20 mg  Day 8: 20 mg  Day 9: 20 mg  Day 10: 10 mg  Day 11: 10 mg  Day 12: 10 mg  Day 13: 5 mg  Day 14: 5 mg    Cerave or Cetaphil cream are the moisturizers we discussed.

## 2020-07-07 NOTE — PROGRESS NOTES
Assessment and Plan:    1. Allergic dermatitis due to poison ivy  Severe itching and extensive rash due to likely poison ivy. Discussed prolonged steroid taper given severity of symptoms. Discussed additional symptomatic management. Patient in agreement and all questions answered.   - predniSONE (DELTASONE) 10 MG tablet; Taper as directed over 14 days  Dispense: 31 tablet; Refill: 0  - hydrOXYzine HCL (ATARAX) 25 MG tablet; Take 1-2 tablets (25-50 mg total) by mouth 3 (three) times daily as needed for Itching.  Dispense: 30 tablet; Refill: 0  - triamcinolone acetonide 0.1% (KENALOG) 0.1 % ointment; Apply topically 2 (two) times daily.  Dispense: 30 g; Refill: 2  - betamethasone acetate-betamethasone sodium phosphate injection 6 mg    ______________________________________________________________________  Subjective:    Chief Complaint:  Itching and rash    HPI:  Kadi is a 70 y.o. year old female here for evaluation of itching and rash    She reports that she had onset of diffuse redness and itching after she had been in her garden and had been pulling a weed that she now suspects was poison ivy. She has tried using benadryl, but it is not helping. She reports that the symptoms are severe, she is itching constantly and is unable to sleep due to severe itching. She has lesions on her arms, legs, abdomen and back but not on her face. She has had similar reactions to poison ivy in the past, but not as severe.     Medications:  Current Outpatient Medications on File Prior to Visit   Medication Sig Dispense Refill    diphenhydrAMINE (BENADRYL) 50 MG tablet Take 50 mg by mouth nightly as needed for Itching.      levothyroxine (SYNTHROID) 137 MCG Tab tablet Take 1 tablet (137 mcg total) by mouth before breakfast. 90 tablet 2    triamcinolone acetonide 0.5% (KENALOG) 0.5 % Crea Apply topically 2 (two) times daily. for 10 days (Patient not taking: Reported on 7/7/2020) 15 g 0     No current facility-administered  "medications on file prior to visit.        Review of Systems:  Review of Systems   Constitutional: Negative for chills and fever.   Skin: Positive for rash. Negative for wound.       Past Medical History:  Past Medical History:   Diagnosis Date    Depression     Hyperlipidemia     Osteopenia     Thyroid disease        Objective:    Vitals:  Vitals:    07/07/20 1346   BP: (!) 142/80   Pulse: 76   Resp: 18   Temp: 98.1 °F (36.7 °C)   TempSrc: Temporal   SpO2: 99%   Weight: 65.5 kg (144 lb 4.7 oz)   Height: 5' 5" (1.651 m)   PainSc: 0-No pain       Physical Exam  Vitals signs reviewed.   Constitutional:       General: She is not in acute distress.     Appearance: She is well-developed.   Eyes:      General:         Right eye: No discharge.         Left eye: No discharge.      Conjunctiva/sclera: Conjunctivae normal.   Cardiovascular:      Rate and Rhythm: Normal rate and regular rhythm.   Pulmonary:      Effort: Pulmonary effort is normal. No respiratory distress.   Skin:     General: Skin is warm and dry.      Comments: areas of erythematous papules and plaques, wheals, and some linear lesions on bilateral arms, legs, and abdomen as pictured   Neurological:      Mental Status: She is alert and oriented to person, place, and time.   Psychiatric:         Behavior: Behavior normal.         Thought Content: Thought content normal.         Judgment: Judgment normal.                              Data:  Previous labs reviewed and pertinent for TSH 0.98.      Saskia Cheema MD  Internal Medicine  "

## 2020-07-17 DIAGNOSIS — Z71.89 COMPLEX CARE COORDINATION: ICD-10-CM

## 2020-09-16 ENCOUNTER — TELEPHONE (OUTPATIENT)
Dept: FAMILY MEDICINE | Facility: CLINIC | Age: 70
End: 2020-09-16

## 2020-09-16 ENCOUNTER — PATIENT MESSAGE (OUTPATIENT)
Dept: FAMILY MEDICINE | Facility: CLINIC | Age: 70
End: 2020-09-16

## 2020-09-16 DIAGNOSIS — E03.9 HYPOTHYROIDISM, UNSPECIFIED TYPE: Primary | ICD-10-CM

## 2020-09-16 DIAGNOSIS — E78.5 HYPERLIPIDEMIA, UNSPECIFIED HYPERLIPIDEMIA TYPE: ICD-10-CM

## 2020-09-25 ENCOUNTER — CLINICAL SUPPORT (OUTPATIENT)
Dept: FAMILY MEDICINE | Facility: CLINIC | Age: 70
End: 2020-09-25
Payer: MEDICARE

## 2020-09-25 ENCOUNTER — LAB VISIT (OUTPATIENT)
Dept: LAB | Facility: HOSPITAL | Age: 70
End: 2020-09-25
Attending: INTERNAL MEDICINE
Payer: MEDICARE

## 2020-09-25 DIAGNOSIS — E78.5 HYPERLIPIDEMIA, UNSPECIFIED HYPERLIPIDEMIA TYPE: ICD-10-CM

## 2020-09-25 DIAGNOSIS — E03.9 HYPOTHYROIDISM, UNSPECIFIED TYPE: ICD-10-CM

## 2020-09-25 DIAGNOSIS — Z23 NEEDS FLU SHOT: Primary | ICD-10-CM

## 2020-09-25 PROCEDURE — 80061 LIPID PANEL: CPT

## 2020-09-25 PROCEDURE — 90694 VACC AIIV4 NO PRSRV 0.5ML IM: CPT | Mod: PBBFAC,PN

## 2020-09-25 PROCEDURE — 84443 ASSAY THYROID STIM HORMONE: CPT

## 2020-09-25 PROCEDURE — G0008 ADMIN INFLUENZA VIRUS VAC: HCPCS | Mod: PBBFAC

## 2020-09-25 PROCEDURE — 84439 ASSAY OF FREE THYROXINE: CPT

## 2020-09-25 PROCEDURE — 36415 COLL VENOUS BLD VENIPUNCTURE: CPT | Mod: PN

## 2020-09-25 PROCEDURE — 80053 COMPREHEN METABOLIC PANEL: CPT

## 2020-09-26 LAB
ALBUMIN SERPL BCP-MCNC: 4.2 G/DL (ref 3.5–5.2)
ALP SERPL-CCNC: 88 U/L (ref 55–135)
ALT SERPL W/O P-5'-P-CCNC: 25 U/L (ref 10–44)
ANION GAP SERPL CALC-SCNC: 11 MMOL/L (ref 8–16)
AST SERPL-CCNC: 33 U/L (ref 10–40)
BILIRUB SERPL-MCNC: 0.4 MG/DL (ref 0.1–1)
BUN SERPL-MCNC: 10 MG/DL (ref 8–23)
CALCIUM SERPL-MCNC: 9.8 MG/DL (ref 8.7–10.5)
CHLORIDE SERPL-SCNC: 103 MMOL/L (ref 95–110)
CHOLEST SERPL-MCNC: 214 MG/DL (ref 120–199)
CHOLEST/HDLC SERPL: 3.1 {RATIO} (ref 2–5)
CO2 SERPL-SCNC: 26 MMOL/L (ref 23–29)
CREAT SERPL-MCNC: 0.8 MG/DL (ref 0.5–1.4)
EST. GFR  (AFRICAN AMERICAN): >60 ML/MIN/1.73 M^2
EST. GFR  (NON AFRICAN AMERICAN): >60 ML/MIN/1.73 M^2
GLUCOSE SERPL-MCNC: 82 MG/DL (ref 70–110)
HDLC SERPL-MCNC: 70 MG/DL (ref 40–75)
HDLC SERPL: 32.7 % (ref 20–50)
LDLC SERPL CALC-MCNC: 130.4 MG/DL (ref 63–159)
NONHDLC SERPL-MCNC: 144 MG/DL
POTASSIUM SERPL-SCNC: 4.2 MMOL/L (ref 3.5–5.1)
PROT SERPL-MCNC: 7.2 G/DL (ref 6–8.4)
SODIUM SERPL-SCNC: 140 MMOL/L (ref 136–145)
T4 FREE SERPL-MCNC: 1.46 NG/DL (ref 0.71–1.51)
TRIGL SERPL-MCNC: 68 MG/DL (ref 30–150)
TSH SERPL DL<=0.005 MIU/L-ACNC: 0.04 UIU/ML (ref 0.4–4)

## 2020-09-27 ENCOUNTER — PATIENT MESSAGE (OUTPATIENT)
Dept: FAMILY MEDICINE | Facility: CLINIC | Age: 70
End: 2020-09-27

## 2020-10-05 ENCOUNTER — PATIENT MESSAGE (OUTPATIENT)
Dept: ADMINISTRATIVE | Facility: HOSPITAL | Age: 70
End: 2020-10-05

## 2020-10-09 DIAGNOSIS — Z12.11 COLON CANCER SCREENING: ICD-10-CM

## 2020-10-30 ENCOUNTER — PATIENT MESSAGE (OUTPATIENT)
Dept: ADMINISTRATIVE | Facility: HOSPITAL | Age: 70
End: 2020-10-30

## 2020-11-24 ENCOUNTER — PATIENT MESSAGE (OUTPATIENT)
Dept: FAMILY MEDICINE | Facility: CLINIC | Age: 70
End: 2020-11-24

## 2020-12-30 ENCOUNTER — PATIENT OUTREACH (OUTPATIENT)
Dept: ADMINISTRATIVE | Facility: HOSPITAL | Age: 70
End: 2020-12-30

## 2020-12-30 NOTE — PROGRESS NOTES
Due for a Mammogram Exam    Non-compliant GAP report chart review - Chart review completed for the following HM test if overdue  (Mammogram)  12/30/2020    Care Everywhere and Media reports - updates requested and reviewed.            DIS reviewed for test needed.  Mammogram

## 2021-01-04 ENCOUNTER — PATIENT MESSAGE (OUTPATIENT)
Dept: ADMINISTRATIVE | Facility: HOSPITAL | Age: 71
End: 2021-01-04

## 2021-01-22 ENCOUNTER — PATIENT MESSAGE (OUTPATIENT)
Dept: ADMINISTRATIVE | Facility: OTHER | Age: 71
End: 2021-01-22

## 2021-02-11 ENCOUNTER — PATIENT MESSAGE (OUTPATIENT)
Dept: FAMILY MEDICINE | Facility: CLINIC | Age: 71
End: 2021-02-11

## 2021-02-22 ENCOUNTER — PATIENT MESSAGE (OUTPATIENT)
Dept: FAMILY MEDICINE | Facility: CLINIC | Age: 71
End: 2021-02-22

## 2021-03-25 ENCOUNTER — PATIENT MESSAGE (OUTPATIENT)
Dept: FAMILY MEDICINE | Facility: CLINIC | Age: 71
End: 2021-03-25

## 2021-04-06 ENCOUNTER — PATIENT MESSAGE (OUTPATIENT)
Dept: ADMINISTRATIVE | Facility: HOSPITAL | Age: 71
End: 2021-04-06

## 2021-05-05 ENCOUNTER — PATIENT MESSAGE (OUTPATIENT)
Dept: FAMILY MEDICINE | Facility: CLINIC | Age: 71
End: 2021-05-05

## 2021-06-26 DIAGNOSIS — E03.9 HYPOTHYROIDISM, UNSPECIFIED TYPE: ICD-10-CM

## 2021-06-26 DIAGNOSIS — E78.5 HYPERLIPIDEMIA, UNSPECIFIED HYPERLIPIDEMIA TYPE: Primary | ICD-10-CM

## 2021-06-29 RX ORDER — LEVOTHYROXINE SODIUM 137 UG/1
TABLET ORAL
Qty: 30 TABLET | Refills: 0 | Status: SHIPPED | OUTPATIENT
Start: 2021-06-29 | End: 2021-07-30

## 2021-07-07 ENCOUNTER — PATIENT MESSAGE (OUTPATIENT)
Dept: ADMINISTRATIVE | Facility: HOSPITAL | Age: 71
End: 2021-07-07

## 2021-07-08 ENCOUNTER — LAB VISIT (OUTPATIENT)
Dept: LAB | Facility: HOSPITAL | Age: 71
End: 2021-07-08
Payer: MEDICARE

## 2021-07-08 DIAGNOSIS — E03.9 HYPOTHYROIDISM, UNSPECIFIED TYPE: ICD-10-CM

## 2021-07-08 DIAGNOSIS — E78.5 HYPERLIPIDEMIA, UNSPECIFIED HYPERLIPIDEMIA TYPE: ICD-10-CM

## 2021-07-08 LAB
ANION GAP SERPL CALC-SCNC: 9 MMOL/L (ref 8–16)
BUN SERPL-MCNC: 8 MG/DL (ref 8–23)
CALCIUM SERPL-MCNC: 10.6 MG/DL (ref 8.7–10.5)
CHLORIDE SERPL-SCNC: 103 MMOL/L (ref 95–110)
CO2 SERPL-SCNC: 25 MMOL/L (ref 23–29)
CREAT SERPL-MCNC: 0.8 MG/DL (ref 0.5–1.4)
EST. GFR  (AFRICAN AMERICAN): >60 ML/MIN/1.73 M^2
EST. GFR  (NON AFRICAN AMERICAN): >60 ML/MIN/1.73 M^2
GLUCOSE SERPL-MCNC: 80 MG/DL (ref 70–110)
POTASSIUM SERPL-SCNC: 4.4 MMOL/L (ref 3.5–5.1)
SODIUM SERPL-SCNC: 137 MMOL/L (ref 136–145)
T4 FREE SERPL-MCNC: 1.24 NG/DL (ref 0.71–1.51)
TSH SERPL DL<=0.005 MIU/L-ACNC: 0.03 UIU/ML (ref 0.4–4)

## 2021-07-08 PROCEDURE — 36415 COLL VENOUS BLD VENIPUNCTURE: CPT | Mod: PN | Performed by: INTERNAL MEDICINE

## 2021-07-08 PROCEDURE — 84443 ASSAY THYROID STIM HORMONE: CPT | Performed by: INTERNAL MEDICINE

## 2021-07-08 PROCEDURE — 80048 BASIC METABOLIC PNL TOTAL CA: CPT | Performed by: INTERNAL MEDICINE

## 2021-07-08 PROCEDURE — 84439 ASSAY OF FREE THYROXINE: CPT | Performed by: INTERNAL MEDICINE

## 2021-07-19 ENCOUNTER — PATIENT MESSAGE (OUTPATIENT)
Dept: FAMILY MEDICINE | Facility: CLINIC | Age: 71
End: 2021-07-19

## 2021-08-10 ENCOUNTER — OFFICE VISIT (OUTPATIENT)
Dept: FAMILY MEDICINE | Facility: CLINIC | Age: 71
End: 2021-08-10
Payer: MEDICARE

## 2021-08-10 VITALS — BODY MASS INDEX: 26.46 KG/M2 | HEIGHT: 64 IN | WEIGHT: 155 LBS

## 2021-08-10 DIAGNOSIS — E03.9 HYPOTHYROIDISM, UNSPECIFIED TYPE: Primary | ICD-10-CM

## 2021-08-10 DIAGNOSIS — M85.80 OSTEOPENIA, UNSPECIFIED LOCATION: ICD-10-CM

## 2021-08-10 DIAGNOSIS — Z12.31 ENCOUNTER FOR SCREENING MAMMOGRAM FOR MALIGNANT NEOPLASM OF BREAST: ICD-10-CM

## 2021-08-10 DIAGNOSIS — E83.52 HYPERCALCEMIA: ICD-10-CM

## 2021-08-10 DIAGNOSIS — Z78.0 ASYMPTOMATIC POSTMENOPAUSAL STATE: ICD-10-CM

## 2021-08-10 PROCEDURE — 99214 OFFICE O/P EST MOD 30 MIN: CPT | Mod: 95,,, | Performed by: INTERNAL MEDICINE

## 2021-08-10 PROCEDURE — 99214 PR OFFICE/OUTPT VISIT, EST, LEVL IV, 30-39 MIN: ICD-10-PCS | Mod: 95,,, | Performed by: INTERNAL MEDICINE

## 2021-08-10 RX ORDER — LEVOTHYROXINE SODIUM 137 UG/1
137 TABLET ORAL
Qty: 90 TABLET | Refills: 0 | Status: SHIPPED | OUTPATIENT
Start: 2021-08-10 | End: 2021-12-09

## 2021-10-20 ENCOUNTER — IMMUNIZATION (OUTPATIENT)
Dept: PHARMACY | Facility: CLINIC | Age: 71
End: 2021-10-20
Payer: COMMERCIAL

## 2021-11-10 DIAGNOSIS — Z12.11 COLON CANCER SCREENING: ICD-10-CM

## 2021-11-16 ENCOUNTER — PATIENT MESSAGE (OUTPATIENT)
Dept: FAMILY MEDICINE | Facility: CLINIC | Age: 71
End: 2021-11-16
Payer: COMMERCIAL

## 2021-12-06 DIAGNOSIS — E03.9 HYPOTHYROIDISM, UNSPECIFIED TYPE: ICD-10-CM

## 2021-12-09 ENCOUNTER — PATIENT MESSAGE (OUTPATIENT)
Dept: ADMINISTRATIVE | Facility: HOSPITAL | Age: 71
End: 2021-12-09
Payer: COMMERCIAL

## 2021-12-09 ENCOUNTER — PATIENT OUTREACH (OUTPATIENT)
Dept: ADMINISTRATIVE | Facility: HOSPITAL | Age: 71
End: 2021-12-09
Payer: COMMERCIAL

## 2021-12-09 RX ORDER — LEVOTHYROXINE SODIUM 137 UG/1
TABLET ORAL
Qty: 90 TABLET | Refills: 0 | Status: SHIPPED | OUTPATIENT
Start: 2021-12-09 | End: 2022-05-10 | Stop reason: SDUPTHER

## 2021-12-20 ENCOUNTER — PATIENT MESSAGE (OUTPATIENT)
Dept: ADMINISTRATIVE | Facility: HOSPITAL | Age: 71
End: 2021-12-20
Payer: COMMERCIAL

## 2022-01-06 ENCOUNTER — PATIENT OUTREACH (OUTPATIENT)
Dept: ADMINISTRATIVE | Facility: HOSPITAL | Age: 72
End: 2022-01-06
Payer: COMMERCIAL

## 2022-01-06 NOTE — PROGRESS NOTES
Non-compliant report chart audits. Chart review completed for HM test overdue (Mammogram, Colon Cancer Screening, Pap smear, DM labs, and/or Eye Exam)      Care Everywhere and media, updates requested and reviewed.        DIS reviewed for test needed.  Mammogram- Pt not found     Attempted to contact to schedule mammo, left vm. Unable to send portal message.

## 2022-01-19 ENCOUNTER — LAB VISIT (OUTPATIENT)
Dept: LAB | Facility: HOSPITAL | Age: 72
End: 2022-01-19
Attending: INTERNAL MEDICINE
Payer: COMMERCIAL

## 2022-01-19 DIAGNOSIS — Z12.11 COLON CANCER SCREENING: ICD-10-CM

## 2022-01-19 PROCEDURE — 82274 ASSAY TEST FOR BLOOD FECAL: CPT | Performed by: INTERNAL MEDICINE

## 2022-01-21 LAB — HEMOCCULT STL QL IA: NEGATIVE

## 2022-03-08 ENCOUNTER — PATIENT MESSAGE (OUTPATIENT)
Dept: FAMILY MEDICINE | Facility: CLINIC | Age: 72
End: 2022-03-08
Payer: COMMERCIAL

## 2022-05-09 ENCOUNTER — PATIENT MESSAGE (OUTPATIENT)
Dept: FAMILY MEDICINE | Facility: CLINIC | Age: 72
End: 2022-05-09
Payer: COMMERCIAL

## 2022-05-09 DIAGNOSIS — E03.9 HYPOTHYROIDISM, UNSPECIFIED TYPE: ICD-10-CM

## 2022-05-10 RX ORDER — LEVOTHYROXINE SODIUM 137 UG/1
137 TABLET ORAL
Qty: 90 TABLET | Refills: 0 | Status: SHIPPED | OUTPATIENT
Start: 2022-05-10 | End: 2022-08-08

## 2022-05-10 NOTE — TELEPHONE ENCOUNTER
No new care gaps identified.  [facility] Embedded Care Gaps. Reference number: [id number]. 5/10/2022   1:23:55 PM CDT

## 2022-05-13 ENCOUNTER — PATIENT MESSAGE (OUTPATIENT)
Dept: FAMILY MEDICINE | Facility: CLINIC | Age: 72
End: 2022-05-13
Payer: COMMERCIAL

## 2022-05-13 ENCOUNTER — LAB VISIT (OUTPATIENT)
Dept: LAB | Facility: HOSPITAL | Age: 72
End: 2022-05-13
Attending: INTERNAL MEDICINE
Payer: MEDICARE

## 2022-05-13 DIAGNOSIS — L23.7 ALLERGIC DERMATITIS DUE TO POISON IVY: Primary | ICD-10-CM

## 2022-05-13 DIAGNOSIS — E83.52 HYPERCALCEMIA: ICD-10-CM

## 2022-05-13 DIAGNOSIS — E03.9 HYPOTHYROIDISM, UNSPECIFIED TYPE: ICD-10-CM

## 2022-05-13 LAB
ANION GAP SERPL CALC-SCNC: 10 MMOL/L (ref 8–16)
BUN SERPL-MCNC: 10 MG/DL (ref 8–23)
CALCIUM SERPL-MCNC: 9.5 MG/DL (ref 8.7–10.5)
CHLORIDE SERPL-SCNC: 103 MMOL/L (ref 95–110)
CO2 SERPL-SCNC: 25 MMOL/L (ref 23–29)
CREAT SERPL-MCNC: 0.8 MG/DL (ref 0.5–1.4)
EST. GFR  (AFRICAN AMERICAN): >60 ML/MIN/1.73 M^2
EST. GFR  (NON AFRICAN AMERICAN): >60 ML/MIN/1.73 M^2
GLUCOSE SERPL-MCNC: 81 MG/DL (ref 70–110)
POTASSIUM SERPL-SCNC: 4.3 MMOL/L (ref 3.5–5.1)
SODIUM SERPL-SCNC: 138 MMOL/L (ref 136–145)
T4 FREE SERPL-MCNC: 0.86 NG/DL (ref 0.71–1.51)
TSH SERPL DL<=0.005 MIU/L-ACNC: 10.95 UIU/ML (ref 0.4–4)

## 2022-05-13 PROCEDURE — 80048 BASIC METABOLIC PNL TOTAL CA: CPT | Performed by: INTERNAL MEDICINE

## 2022-05-13 PROCEDURE — 84443 ASSAY THYROID STIM HORMONE: CPT | Performed by: INTERNAL MEDICINE

## 2022-05-13 PROCEDURE — 36415 COLL VENOUS BLD VENIPUNCTURE: CPT | Mod: PN | Performed by: INTERNAL MEDICINE

## 2022-05-13 PROCEDURE — 84439 ASSAY OF FREE THYROXINE: CPT | Performed by: INTERNAL MEDICINE

## 2022-05-13 RX ORDER — HYDROXYZINE HYDROCHLORIDE 25 MG/1
25 TABLET, FILM COATED ORAL 3 TIMES DAILY PRN
Qty: 30 TABLET | Refills: 1 | Status: SHIPPED | OUTPATIENT
Start: 2022-05-13 | End: 2023-04-30 | Stop reason: SDUPTHER

## 2022-05-25 ENCOUNTER — PATIENT MESSAGE (OUTPATIENT)
Dept: FAMILY MEDICINE | Facility: CLINIC | Age: 72
End: 2022-05-25
Payer: COMMERCIAL

## 2022-06-24 ENCOUNTER — IMMUNIZATION (OUTPATIENT)
Dept: PHARMACY | Facility: CLINIC | Age: 72
End: 2022-06-24
Payer: MEDICARE

## 2022-06-24 DIAGNOSIS — Z23 NEED FOR VACCINATION: Primary | ICD-10-CM

## 2022-07-25 ENCOUNTER — PATIENT MESSAGE (OUTPATIENT)
Dept: FAMILY MEDICINE | Facility: CLINIC | Age: 72
End: 2022-07-25
Payer: COMMERCIAL

## 2022-08-03 ENCOUNTER — TELEPHONE (OUTPATIENT)
Dept: FAMILY MEDICINE | Facility: CLINIC | Age: 72
End: 2022-08-03
Payer: COMMERCIAL

## 2022-08-03 ENCOUNTER — PATIENT MESSAGE (OUTPATIENT)
Dept: FAMILY MEDICINE | Facility: CLINIC | Age: 72
End: 2022-08-03
Payer: COMMERCIAL

## 2022-08-03 DIAGNOSIS — E78.5 HYPERLIPIDEMIA, UNSPECIFIED HYPERLIPIDEMIA TYPE: ICD-10-CM

## 2022-08-03 DIAGNOSIS — E03.9 HYPOTHYROIDISM, UNSPECIFIED TYPE: Primary | ICD-10-CM

## 2022-08-03 NOTE — TELEPHONE ENCOUNTER
----- Message from Yenifer Veloz sent at 8/3/2022 10:03 AM CDT -----  Regarding: pt called  Name of Who is Calling: HUMA FIERRO [[id number]]      What is the request in detail: pt needs lab orders for thyroid, and blood work orders that is needed for her upcoming appt. Please advise       Can the clinic reply by [facility]: No       What Number to Call Back if not in [facility]: [phone] (home)

## 2022-08-05 ENCOUNTER — LAB VISIT (OUTPATIENT)
Dept: LAB | Facility: HOSPITAL | Age: 72
End: 2022-08-05
Attending: INTERNAL MEDICINE
Payer: MEDICARE

## 2022-08-05 DIAGNOSIS — E78.5 HYPERLIPIDEMIA, UNSPECIFIED HYPERLIPIDEMIA TYPE: ICD-10-CM

## 2022-08-05 DIAGNOSIS — E03.9 HYPOTHYROIDISM, UNSPECIFIED TYPE: ICD-10-CM

## 2022-08-05 LAB
ALBUMIN SERPL BCP-MCNC: 3.9 G/DL (ref 3.5–5.2)
ALP SERPL-CCNC: 80 U/L (ref 55–135)
ALT SERPL W/O P-5'-P-CCNC: 22 U/L (ref 10–44)
ANION GAP SERPL CALC-SCNC: 7 MMOL/L (ref 8–16)
AST SERPL-CCNC: 30 U/L (ref 10–40)
BILIRUB SERPL-MCNC: 0.4 MG/DL (ref 0.1–1)
BUN SERPL-MCNC: 12 MG/DL (ref 8–23)
CALCIUM SERPL-MCNC: 9.6 MG/DL (ref 8.7–10.5)
CHLORIDE SERPL-SCNC: 103 MMOL/L (ref 95–110)
CHOLEST SERPL-MCNC: 185 MG/DL (ref 120–199)
CHOLEST/HDLC SERPL: 2.8 {RATIO} (ref 2–5)
CO2 SERPL-SCNC: 27 MMOL/L (ref 23–29)
CREAT SERPL-MCNC: 0.7 MG/DL (ref 0.5–1.4)
EST. GFR  (NO RACE VARIABLE): >60 ML/MIN/1.73 M^2
GLUCOSE SERPL-MCNC: 88 MG/DL (ref 70–110)
HDLC SERPL-MCNC: 66 MG/DL (ref 40–75)
HDLC SERPL: 35.7 % (ref 20–50)
LDLC SERPL CALC-MCNC: 109 MG/DL (ref 63–159)
NONHDLC SERPL-MCNC: 119 MG/DL
POTASSIUM SERPL-SCNC: 4.2 MMOL/L (ref 3.5–5.1)
PROT SERPL-MCNC: 7 G/DL (ref 6–8.4)
SODIUM SERPL-SCNC: 137 MMOL/L (ref 136–145)
T4 FREE SERPL-MCNC: 1.36 NG/DL (ref 0.71–1.51)
TRIGL SERPL-MCNC: 50 MG/DL (ref 30–150)
TSH SERPL DL<=0.005 MIU/L-ACNC: 0.04 UIU/ML (ref 0.4–4)

## 2022-08-05 PROCEDURE — 36415 COLL VENOUS BLD VENIPUNCTURE: CPT | Mod: PN | Performed by: INTERNAL MEDICINE

## 2022-08-05 PROCEDURE — 80053 COMPREHEN METABOLIC PANEL: CPT | Performed by: INTERNAL MEDICINE

## 2022-08-05 PROCEDURE — 84443 ASSAY THYROID STIM HORMONE: CPT | Performed by: INTERNAL MEDICINE

## 2022-08-05 PROCEDURE — 84439 ASSAY OF FREE THYROXINE: CPT | Performed by: INTERNAL MEDICINE

## 2022-08-05 PROCEDURE — 80061 LIPID PANEL: CPT | Performed by: INTERNAL MEDICINE

## 2022-08-08 DIAGNOSIS — E03.9 HYPOTHYROIDISM, UNSPECIFIED TYPE: ICD-10-CM

## 2022-08-08 RX ORDER — LEVOTHYROXINE SODIUM 137 UG/1
TABLET ORAL
Qty: 90 TABLET | Refills: 0 | Status: SHIPPED | OUTPATIENT
Start: 2022-08-08 | End: 2022-08-10

## 2022-08-08 NOTE — TELEPHONE ENCOUNTER
Refill Decision Note   Kadi Hiral  is requesting a refill authorization.  Brief Assessment and Rationale for Refill:  Approve     Medication Therapy Plan:       Medication Reconciliation Completed: No   Comments:     No Care Gaps recommended.     Note composed:6:05 PM 08/08/2022

## 2022-08-08 NOTE — TELEPHONE ENCOUNTER
No new care gaps identified.  [facility] Embedded Care Gaps. Reference number: [id number]. 8/08/2022   3:22:15 AM CDT

## 2022-08-09 ENCOUNTER — PATIENT MESSAGE (OUTPATIENT)
Dept: FAMILY MEDICINE | Facility: CLINIC | Age: 72
End: 2022-08-09
Payer: COMMERCIAL

## 2022-08-09 DIAGNOSIS — E03.9 HYPOTHYROIDISM, UNSPECIFIED TYPE: Primary | ICD-10-CM

## 2022-08-10 RX ORDER — LEVOTHYROXINE SODIUM 125 UG/1
125 TABLET ORAL
Qty: 30 TABLET | Refills: 2 | Status: SHIPPED | OUTPATIENT
Start: 2022-08-10 | End: 2022-11-08

## 2022-08-12 ENCOUNTER — PATIENT MESSAGE (OUTPATIENT)
Dept: ADMINISTRATIVE | Facility: HOSPITAL | Age: 72
End: 2022-08-12
Payer: COMMERCIAL

## 2022-08-12 ENCOUNTER — PATIENT OUTREACH (OUTPATIENT)
Dept: ADMINISTRATIVE | Facility: HOSPITAL | Age: 72
End: 2022-08-12
Payer: COMMERCIAL

## 2022-08-12 NOTE — PROGRESS NOTES
2022 Care Everywhere updates requested and reviewed.  Immunizations reconciled. Media reports reviewed.  Duplicate HM overrides and  orders removed.  Overdue HM topic chart audit and/or requested.  Overdue lab testing linked to upcoming lab appointments if applies.  DIS reviewed      Mammogram and DEXA      Health Maintenance Due   Topic Date Due    Shingles Vaccine (2 of 3) 10/30/2014    Mammogram  2017    DEXA Scan  2019

## 2022-08-26 ENCOUNTER — OFFICE VISIT (OUTPATIENT)
Dept: FAMILY MEDICINE | Facility: CLINIC | Age: 72
End: 2022-08-26
Payer: MEDICARE

## 2022-08-26 VITALS
HEIGHT: 64 IN | RESPIRATION RATE: 18 BRPM | HEART RATE: 81 BPM | BODY MASS INDEX: 24.32 KG/M2 | DIASTOLIC BLOOD PRESSURE: 60 MMHG | WEIGHT: 142.44 LBS | SYSTOLIC BLOOD PRESSURE: 128 MMHG | OXYGEN SATURATION: 98 %

## 2022-08-26 DIAGNOSIS — M85.80 OSTEOPENIA, UNSPECIFIED LOCATION: ICD-10-CM

## 2022-08-26 DIAGNOSIS — Z78.0 ASYMPTOMATIC POSTMENOPAUSAL STATE: ICD-10-CM

## 2022-08-26 DIAGNOSIS — F41.9 ANXIETY: Primary | ICD-10-CM

## 2022-08-26 DIAGNOSIS — E03.9 HYPOTHYROIDISM, UNSPECIFIED TYPE: ICD-10-CM

## 2022-08-26 DIAGNOSIS — Z12.31 ENCOUNTER FOR SCREENING MAMMOGRAM FOR MALIGNANT NEOPLASM OF BREAST: ICD-10-CM

## 2022-08-26 PROCEDURE — 99999 PR PBB SHADOW E&M-EST. PATIENT-LVL IV: CPT | Mod: PBBFAC,,, | Performed by: INTERNAL MEDICINE

## 2022-08-26 PROCEDURE — 99214 OFFICE O/P EST MOD 30 MIN: CPT | Mod: S$PBB,,, | Performed by: INTERNAL MEDICINE

## 2022-08-26 PROCEDURE — 99214 PR OFFICE/OUTPT VISIT, EST, LEVL IV, 30-39 MIN: ICD-10-PCS | Mod: S$PBB,,, | Performed by: INTERNAL MEDICINE

## 2022-08-26 PROCEDURE — 99214 OFFICE O/P EST MOD 30 MIN: CPT | Mod: PBBFAC,PN | Performed by: INTERNAL MEDICINE

## 2022-08-26 PROCEDURE — 99999 PR PBB SHADOW E&M-EST. PATIENT-LVL IV: ICD-10-PCS | Mod: PBBFAC,,, | Performed by: INTERNAL MEDICINE

## 2022-08-26 RX ORDER — FLUOXETINE 10 MG/1
10 CAPSULE ORAL DAILY
Qty: 30 CAPSULE | Refills: 2 | Status: SHIPPED | OUTPATIENT
Start: 2022-08-26 | End: 2022-11-22 | Stop reason: SDUPTHER

## 2022-08-26 NOTE — PROGRESS NOTES
Assessment and Plan:    1. Anxiety  Restart 10 mg. Will send message in 6 weeks to determine whether to stay with 10 mg dose or increase back to 20 mg dose.  - FLUoxetine 10 MG capsule; Take 1 capsule (10 mg total) by mouth once daily.  Dispense: 30 capsule; Refill: 2    2. Hypothyroidism, unspecified type  Will plan to recheck TSH after 6 weeks on current dose.     3. Osteopenia, unspecified location  - DXA Bone Density Spine And Hip; Future    4. Asymptomatic postmenopausal state  - DXA Bone Density Spine And Hip; Future    5. Encounter for screening mammogram for malignant neoplasm of breast  - Mammo Digital Screening Bilat; Future      ______________________________________________________________________  Subjective:    Chief Complaint:  Follow up chronic medical conditions.    HPI:  Kadi is a 72 y.o. year old female here to follow up chronic medical conditions.     She had been on prozac for many years for anxiety. Was doing well with this when she was on it, but had stopped this back in 2016. Mainly had stopped this because she was doing OK, and was concerned about not wanting to be on a medication. She is getting more and more anxious about COVID and would like to restart this.    For hypothyroidism she is currently taking levothyroxine 125 mcg. Dose recently reduced. No change in symptoms at this time.    She had osteopenia on DEXA from 2016, but has not had follow up since.    Medications:  Current Outpatient Medications on File Prior to Visit   Medication Sig Dispense Refill    hydrOXYzine HCL (ATARAX) 25 MG tablet Take 1 tablet (25 mg total) by mouth 3 (three) times daily as needed for Itching. 30 tablet 1    levothyroxine (SYNTHROID) 125 MCG tablet Take 1 tablet (125 mcg total) by mouth before breakfast. 30 tablet 2    [DISCONTINUED] triamcinolone acetonide 0.1% (KENALOG) 0.1 % ointment Apply topically 2 (two) times daily. 30 g 2     No current facility-administered medications on file prior to  "visit.       Review of Systems:  Review of Systems   Constitutional: Negative for activity change and unexpected weight change.   HENT: Negative for hearing loss, rhinorrhea and trouble swallowing.    Eyes: Negative for discharge and visual disturbance.   Respiratory: Negative for chest tightness and wheezing.    Cardiovascular: Negative for chest pain and palpitations.   Gastrointestinal: Negative for blood in stool, constipation, diarrhea and vomiting.   Endocrine: Negative for polydipsia and polyuria.   Genitourinary: Negative for difficulty urinating, dysuria, hematuria and menstrual problem.   Musculoskeletal: Negative for arthralgias, joint swelling and neck pain.   Neurological: Negative for weakness and headaches.   Psychiatric/Behavioral: Negative for confusion and dysphoric mood. The patient is nervous/anxious.      Entered by patient and reviewed and updated during visit      Past Medical History:  Past Medical History:   Diagnosis Date    Depression     Hyperlipidemia     Osteopenia     Thyroid disease        Objective:    Vitals:  Vitals:    08/26/22 1347   BP: 128/60   Pulse: 81   Resp: 18   SpO2: 98%   Weight: 64.6 kg (142 lb 6.7 oz)   Height: 5' 4" (1.626 m)   PF: 98 L/min   PainSc: 0-No pain       Physical Exam  Vitals reviewed.   Constitutional:       General: She is not in acute distress.     Appearance: She is well-developed.   Eyes:      General: No scleral icterus.  Cardiovascular:      Rate and Rhythm: Normal rate and regular rhythm.      Heart sounds: No murmur heard.  Pulmonary:      Effort: Pulmonary effort is normal. No respiratory distress.      Breath sounds: Normal breath sounds. No wheezing, rhonchi or rales.   Musculoskeletal:      Right lower leg: No edema.      Left lower leg: No edema.   Skin:     General: Skin is warm and dry.   Neurological:      Mental Status: She is alert and oriented to person, place, and time.   Psychiatric:         Behavior: Behavior normal. "         Data:  Last TSH 0.042    Saskia Cheema MD  Internal Medicine

## 2022-11-22 DIAGNOSIS — F41.9 ANXIETY: ICD-10-CM

## 2022-11-22 RX ORDER — FLUOXETINE 10 MG/1
10 CAPSULE ORAL DAILY
Qty: 90 CAPSULE | Refills: 1 | Status: SHIPPED | OUTPATIENT
Start: 2022-11-22 | End: 2023-03-02

## 2022-11-22 NOTE — TELEPHONE ENCOUNTER
No new care gaps identified.  [facility] Embedded Care Gaps. Reference number: [id number]. 11/22/2022   7:10:46 AM CST

## 2022-12-20 ENCOUNTER — PATIENT MESSAGE (OUTPATIENT)
Dept: ADMINISTRATIVE | Facility: HOSPITAL | Age: 72
End: 2022-12-20
Payer: COMMERCIAL

## 2023-01-18 ENCOUNTER — PATIENT MESSAGE (OUTPATIENT)
Dept: ADMINISTRATIVE | Facility: HOSPITAL | Age: 73
End: 2023-01-18
Payer: COMMERCIAL

## 2023-03-02 DIAGNOSIS — F41.9 ANXIETY: ICD-10-CM

## 2023-03-02 RX ORDER — FLUOXETINE 10 MG/1
CAPSULE ORAL
Qty: 90 CAPSULE | Refills: 1 | Status: SHIPPED | OUTPATIENT
Start: 2023-03-02 | End: 2023-05-02 | Stop reason: SDUPTHER

## 2023-03-02 NOTE — TELEPHONE ENCOUNTER
No new care gaps identified.  [facility] Embedded Care Gaps. Reference number: [id number]. 3/02/2023   8:06:02 AM CST

## 2023-03-02 NOTE — TELEPHONE ENCOUNTER
Refill Decision Note   Kadi Hiral  is requesting a refill authorization.  Brief Assessment and Rationale for Refill:  Approve     Medication Therapy Plan:       Medication Reconciliation Completed: No   Comments:     No Care Gaps recommended.     Note composed:8:09 AM 03/02/2023

## 2023-04-11 ENCOUNTER — PATIENT MESSAGE (OUTPATIENT)
Dept: ADMINISTRATIVE | Facility: HOSPITAL | Age: 73
End: 2023-04-11
Payer: COMMERCIAL

## 2023-04-30 ENCOUNTER — PATIENT MESSAGE (OUTPATIENT)
Dept: FAMILY MEDICINE | Facility: CLINIC | Age: 73
End: 2023-04-30
Payer: COMMERCIAL

## 2023-04-30 DIAGNOSIS — F41.9 ANXIETY: ICD-10-CM

## 2023-04-30 DIAGNOSIS — L23.7 ALLERGIC DERMATITIS DUE TO POISON IVY: ICD-10-CM

## 2023-05-02 RX ORDER — HYDROXYZINE HYDROCHLORIDE 25 MG/1
25 TABLET, FILM COATED ORAL 3 TIMES DAILY PRN
Qty: 30 TABLET | Refills: 1 | Status: SHIPPED | OUTPATIENT
Start: 2023-05-02 | End: 2024-01-03

## 2023-05-02 RX ORDER — FLUOXETINE HYDROCHLORIDE 20 MG/1
20 CAPSULE ORAL DAILY
Qty: 90 CAPSULE | Refills: 0 | Status: SHIPPED | OUTPATIENT
Start: 2023-05-02 | End: 2023-07-30

## 2023-07-30 DIAGNOSIS — F41.9 ANXIETY: ICD-10-CM

## 2023-07-30 RX ORDER — FLUOXETINE HYDROCHLORIDE 20 MG/1
20 CAPSULE ORAL
Qty: 90 CAPSULE | Refills: 0 | Status: SHIPPED | OUTPATIENT
Start: 2023-07-30 | End: 2023-10-30

## 2023-07-30 NOTE — TELEPHONE ENCOUNTER
Care Due:                  Date            Visit Type   Department     Provider  --------------------------------------------------------------------------------                                MYCHART                              FOLLOWUP/OF  [facility] FAMILY  Last Visit: 08-      FICE VISIT   MEDICINE       Saskia Cheema  Next Visit: None Scheduled  None         None Found                                                            Last  Test          Frequency    Reason                     Performed    Due Date  --------------------------------------------------------------------------------    Office Visit  12 months..  FLUoxetine...............  08- 08-    [facility] Embedded Care Due Messages. Reference number: [id number].   7/30/2023 3:23:20 AM CDT

## 2023-07-30 NOTE — TELEPHONE ENCOUNTER
Provider Staff:  Action required for this patient     Please see care gap opportunities below in Care Due Message.    Thanks!  [facility]     Appointments      Date Provider   Last Visit   8/26/2022 Saskia Cheema MD   Next Visit   Visit date not found Saskia Cheema MD     Refill Decision Note   Kadi Blackman  is requesting a refill authorization.  Brief Assessment and Rationale for Refill:  Approve     Medication Therapy Plan:         Comments:     Note composed:12:40 PM 07/30/2023

## 2023-10-18 ENCOUNTER — PATIENT MESSAGE (OUTPATIENT)
Dept: FAMILY MEDICINE | Facility: CLINIC | Age: 73
End: 2023-10-18
Payer: COMMERCIAL

## 2023-10-18 DIAGNOSIS — E03.9 HYPOTHYROIDISM, UNSPECIFIED TYPE: ICD-10-CM

## 2023-10-24 RX ORDER — LEVOTHYROXINE SODIUM 125 UG/1
TABLET ORAL
Qty: 90 TABLET | Refills: 0 | Status: SHIPPED | OUTPATIENT
Start: 2023-10-24 | End: 2024-01-21

## 2023-10-30 DIAGNOSIS — F41.9 ANXIETY: ICD-10-CM

## 2023-10-30 RX ORDER — FLUOXETINE HYDROCHLORIDE 20 MG/1
20 CAPSULE ORAL
Qty: 90 CAPSULE | Refills: 0 | Status: SHIPPED | OUTPATIENT
Start: 2023-10-30 | End: 2024-01-03 | Stop reason: SDUPTHER

## 2023-10-30 NOTE — TELEPHONE ENCOUNTER
No care due was identified.  [facility] Embedded Care Due Messages. Reference number: [id number].   10/30/2023 3:22:17 AM CDT

## 2023-10-30 NOTE — TELEPHONE ENCOUNTER
Refill Decision Note   Kadi Blackman  is requesting a refill authorization.  Brief Assessment and Rationale for Refill:  Approve     Medication Therapy Plan:         Comments:     Note composed:6:04 AM 10/30/2023

## 2023-11-04 DIAGNOSIS — F41.9 ANXIETY: ICD-10-CM

## 2023-11-04 RX ORDER — FLUOXETINE HYDROCHLORIDE 20 MG/1
20 CAPSULE ORAL
Qty: 90 CAPSULE | Refills: 0 | OUTPATIENT
Start: 2023-11-04

## 2023-11-04 NOTE — TELEPHONE ENCOUNTER
Refill Decision Note   Kadi Blackman  is requesting a refill authorization.  Brief Assessment and Rationale for Refill:  Quick Discontinue     Medication Therapy Plan:         Comments:     Note composed:2:11 PM 11/04/2023

## 2023-11-04 NOTE — TELEPHONE ENCOUNTER
No care due was identified.  [facility] Embedded Care Due Messages. Reference number: [id number].   11/04/2023 8:05:25 AM CDT

## 2024-01-03 ENCOUNTER — OFFICE VISIT (OUTPATIENT)
Dept: FAMILY MEDICINE | Facility: CLINIC | Age: 74
End: 2024-01-03
Payer: MEDICARE

## 2024-01-03 VITALS
HEART RATE: 76 BPM | DIASTOLIC BLOOD PRESSURE: 66 MMHG | HEIGHT: 64 IN | WEIGHT: 151.38 LBS | SYSTOLIC BLOOD PRESSURE: 122 MMHG | OXYGEN SATURATION: 96 % | BODY MASS INDEX: 25.84 KG/M2 | RESPIRATION RATE: 16 BRPM

## 2024-01-03 DIAGNOSIS — E03.9 HYPOTHYROIDISM, UNSPECIFIED TYPE: Primary | ICD-10-CM

## 2024-01-03 DIAGNOSIS — Z12.11 SCREENING FOR MALIGNANT NEOPLASM OF COLON: ICD-10-CM

## 2024-01-03 DIAGNOSIS — Z12.31 ENCOUNTER FOR SCREENING MAMMOGRAM FOR MALIGNANT NEOPLASM OF BREAST: ICD-10-CM

## 2024-01-03 DIAGNOSIS — Z78.0 ASYMPTOMATIC POSTMENOPAUSAL STATE: ICD-10-CM

## 2024-01-03 DIAGNOSIS — M85.80 OSTEOPENIA, UNSPECIFIED LOCATION: ICD-10-CM

## 2024-01-03 DIAGNOSIS — F41.9 ANXIETY: ICD-10-CM

## 2024-01-03 DIAGNOSIS — Z13.6 ENCOUNTER FOR SCREENING FOR CARDIOVASCULAR DISORDERS: ICD-10-CM

## 2024-01-03 PROCEDURE — 99999 PR PBB SHADOW E&M-EST. PATIENT-LVL IV: CPT | Mod: PBBFAC,,, | Performed by: INTERNAL MEDICINE

## 2024-01-03 PROCEDURE — 99214 OFFICE O/P EST MOD 30 MIN: CPT | Mod: PBBFAC,PN | Performed by: INTERNAL MEDICINE

## 2024-01-03 PROCEDURE — 99214 OFFICE O/P EST MOD 30 MIN: CPT | Mod: S$PBB,,, | Performed by: INTERNAL MEDICINE

## 2024-01-03 RX ORDER — FLUOXETINE HYDROCHLORIDE 20 MG/1
20 CAPSULE ORAL DAILY
Qty: 90 CAPSULE | Refills: 3 | Status: SHIPPED | OUTPATIENT
Start: 2024-01-03 | End: 2024-02-19 | Stop reason: SDUPTHER

## 2024-01-03 NOTE — PROGRESS NOTES
Assessment and Plan:    1. Hypothyroidism, unspecified type  - TSH; Future    2. Anxiety  - FLUoxetine 20 MG capsule; Take 1 capsule (20 mg total) by mouth once daily.  Dispense: 90 capsule; Refill: 3    3. Osteopenia, unspecified location  - DXA Bone Density Axial Skeleton 1 or more sites; Future  - Vitamin D; Future    4. Asymptomatic postmenopausal state  - DXA Bone Density Axial Skeleton 1 or more sites; Future    5. Encounter for screening mammogram for malignant neoplasm of breast  - Mammo Digital Screening Bilat w/ Ravi; Future    6. Encounter for screening for cardiovascular disorders  - Comprehensive Metabolic Panel; Future  - Lipid Panel; Future    7. Screening for malignant neoplasm of colon  - Fecal Immunochemical Test (iFOBT); Future    ______________________________________________________________________  Subjective:    Chief Complaint:  Follow up chronic medical conditions.    HPI:  Kadi is a 73 y.o. year old female here to follow up chronic medical conditions.     Anxiety- Taking fluoxetine 20 mg daily. Feels that this is working well. No side effects.     Hypothyroidism- Taking levothyroxine 125 daily. Taking this correctly. Having some fatigue.    Osteopenia- Not on medication. Open to this potentially if indicated, but wants to look it up.    Medications:  Current Outpatient Medications on File Prior to Visit   Medication Sig Dispense Refill    FLUoxetine 20 MG capsule TAKE 1 CAPSULE(20 MG) BY MOUTH EVERY DAY 90 capsule 0    levothyroxine (SYNTHROID) 125 MCG tablet TAKE 1 TABLET(125 MCG) BY MOUTH EVERY MORNING BEFORE BREAKFAST 90 tablet 0    hydrOXYzine HCL (ATARAX) 25 MG tablet Take 1 tablet (25 mg total) by mouth 3 (three) times daily as needed for Itching. (Patient not taking: Reported on 1/3/2024) 30 tablet 1     No current facility-administered medications on file prior to visit.       Review of Systems:  Review of Systems   Respiratory:  Negative for shortness of breath and wheezing.   "  Cardiovascular:  Negative for chest pain and leg swelling.   Gastrointestinal:  Negative for diarrhea, nausea and vomiting.   Neurological:  Negative for dizziness, syncope and light-headedness.       Past Medical History:  Past Medical History:   Diagnosis Date    Depression     Hyperlipidemia     Osteopenia     Thyroid disease        Objective:    Vitals:  Vitals:    01/03/24 1501   BP: 122/66   Pulse: 76   Resp: 16   SpO2: 96%   Weight: 68.6 kg (151 lb 5.5 oz)   Height: 5' 4" (1.626 m)       Physical Exam  Vitals reviewed.   Constitutional:       General: She is not in acute distress.     Appearance: She is well-developed.   Eyes:      General: No scleral icterus.        Right eye: No discharge.         Left eye: No discharge.      Conjunctiva/sclera: Conjunctivae normal.   Cardiovascular:      Rate and Rhythm: Normal rate and regular rhythm.      Heart sounds: No murmur heard.  Pulmonary:      Effort: Pulmonary effort is normal. No respiratory distress.      Breath sounds: Normal breath sounds. No wheezing, rhonchi or rales.   Musculoskeletal:      Right lower leg: No edema.      Left lower leg: No edema.   Skin:     General: Skin is warm and dry.   Neurological:      Mental Status: She is alert and oriented to person, place, and time.   Psychiatric:         Behavior: Behavior normal.         Thought Content: Thought content normal.         Judgment: Judgment normal.         Data:  Last TSH 0.04 with normal FT4      Saskia Cheema MD  Internal Medicine    "

## 2024-01-17 ENCOUNTER — LAB VISIT (OUTPATIENT)
Dept: LAB | Facility: HOSPITAL | Age: 74
End: 2024-01-17
Attending: INTERNAL MEDICINE
Payer: MEDICARE

## 2024-01-17 DIAGNOSIS — E03.9 HYPOTHYROIDISM, UNSPECIFIED TYPE: ICD-10-CM

## 2024-01-17 DIAGNOSIS — R30.0 DYSURIA: ICD-10-CM

## 2024-01-17 DIAGNOSIS — Z13.6 ENCOUNTER FOR SCREENING FOR CARDIOVASCULAR DISORDERS: ICD-10-CM

## 2024-01-17 DIAGNOSIS — R30.0 DYSURIA: Primary | ICD-10-CM

## 2024-01-17 DIAGNOSIS — M85.80 OSTEOPENIA, UNSPECIFIED LOCATION: ICD-10-CM

## 2024-01-17 LAB
25(OH)D3+25(OH)D2 SERPL-MCNC: 12 NG/ML (ref 30–96)
ALBUMIN SERPL BCP-MCNC: 4 G/DL (ref 3.5–5.2)
ALP SERPL-CCNC: 68 U/L (ref 55–135)
ALT SERPL W/O P-5'-P-CCNC: 20 U/L (ref 10–44)
ANION GAP SERPL CALC-SCNC: 13 MMOL/L (ref 8–16)
AST SERPL-CCNC: 33 U/L (ref 10–40)
BACTERIA #/AREA URNS AUTO: NORMAL /HPF
BILIRUB SERPL-MCNC: 0.4 MG/DL (ref 0.1–1)
BILIRUB UR QL STRIP: NEGATIVE
BUN SERPL-MCNC: 10 MG/DL (ref 8–23)
CALCIUM SERPL-MCNC: 10 MG/DL (ref 8.7–10.5)
CHLORIDE SERPL-SCNC: 104 MMOL/L (ref 95–110)
CHOLEST SERPL-MCNC: 223 MG/DL (ref 120–199)
CHOLEST/HDLC SERPL: 2.6 {RATIO} (ref 2–5)
CLARITY UR REFRACT.AUTO: CLEAR
CO2 SERPL-SCNC: 23 MMOL/L (ref 23–29)
COLOR UR AUTO: YELLOW
CREAT SERPL-MCNC: 0.8 MG/DL (ref 0.5–1.4)
EST. GFR  (NO RACE VARIABLE): >60 ML/MIN/1.73 M^2
GLUCOSE SERPL-MCNC: 94 MG/DL (ref 70–110)
GLUCOSE UR QL STRIP: NEGATIVE
HDLC SERPL-MCNC: 87 MG/DL (ref 40–75)
HDLC SERPL: 39 % (ref 20–50)
HGB UR QL STRIP: ABNORMAL
KETONES UR QL STRIP: NEGATIVE
LDLC SERPL CALC-MCNC: 126 MG/DL (ref 63–159)
LEUKOCYTE ESTERASE UR QL STRIP: NEGATIVE
MICROSCOPIC COMMENT: NORMAL
NITRITE UR QL STRIP: NEGATIVE
NONHDLC SERPL-MCNC: 136 MG/DL
PH UR STRIP: >8 [PH] (ref 5–8)
POTASSIUM SERPL-SCNC: 4.6 MMOL/L (ref 3.5–5.1)
PROT SERPL-MCNC: 7.7 G/DL (ref 6–8.4)
PROT UR QL STRIP: NEGATIVE
RBC #/AREA URNS AUTO: 0 /HPF (ref 0–4)
SODIUM SERPL-SCNC: 140 MMOL/L (ref 136–145)
SP GR UR STRIP: 1 (ref 1–1.03)
T4 FREE SERPL-MCNC: 0.88 NG/DL (ref 0.71–1.51)
TRI-PHOS CRY UR QL COMP ASSIST: NORMAL
TRIGL SERPL-MCNC: 50 MG/DL (ref 30–150)
TSH SERPL DL<=0.005 MIU/L-ACNC: 11.51 UIU/ML (ref 0.4–4)
URN SPEC COLLECT METH UR: ABNORMAL
WBC #/AREA URNS AUTO: 0 /HPF (ref 0–5)

## 2024-01-17 PROCEDURE — 81001 URINALYSIS AUTO W/SCOPE: CPT | Performed by: INTERNAL MEDICINE

## 2024-01-17 PROCEDURE — 80061 LIPID PANEL: CPT | Performed by: INTERNAL MEDICINE

## 2024-01-17 PROCEDURE — 84439 ASSAY OF FREE THYROXINE: CPT | Performed by: INTERNAL MEDICINE

## 2024-01-17 PROCEDURE — 80053 COMPREHEN METABOLIC PANEL: CPT | Performed by: INTERNAL MEDICINE

## 2024-01-17 PROCEDURE — 84443 ASSAY THYROID STIM HORMONE: CPT | Performed by: INTERNAL MEDICINE

## 2024-01-17 PROCEDURE — 36415 COLL VENOUS BLD VENIPUNCTURE: CPT | Mod: PN | Performed by: INTERNAL MEDICINE

## 2024-01-17 PROCEDURE — 82306 VITAMIN D 25 HYDROXY: CPT | Performed by: INTERNAL MEDICINE

## 2024-01-18 ENCOUNTER — PATIENT MESSAGE (OUTPATIENT)
Dept: FAMILY MEDICINE | Facility: CLINIC | Age: 74
End: 2024-01-18
Payer: COMMERCIAL

## 2024-01-19 DIAGNOSIS — R31.21 ASYMPTOMATIC MICROSCOPIC HEMATURIA: ICD-10-CM

## 2024-01-19 DIAGNOSIS — E03.9 HYPOTHYROIDISM, UNSPECIFIED TYPE: Primary | ICD-10-CM

## 2024-01-19 DIAGNOSIS — E55.9 VITAMIN D DEFICIENCY: ICD-10-CM

## 2024-01-20 DIAGNOSIS — E03.9 HYPOTHYROIDISM, UNSPECIFIED TYPE: ICD-10-CM

## 2024-01-20 NOTE — TELEPHONE ENCOUNTER
No care due was identified.  [facility] Embedded Care Due Messages. Reference number: [id number].   1/20/2024 8:33:04 AM CST

## 2024-01-21 RX ORDER — LEVOTHYROXINE SODIUM 125 UG/1
TABLET ORAL
Qty: 90 TABLET | Refills: 3 | Status: SHIPPED | OUTPATIENT
Start: 2024-01-21

## 2024-01-21 NOTE — TELEPHONE ENCOUNTER
Refill Decision Note   Kadi Hiral  is requesting a refill authorization.  Brief Assessment and Rationale for Refill:  Approve     Medication Therapy Plan:  TSH out of range, but T4 WNL. (1/17/24) OK for approval      Comments:     Note composed:2:22 PM 01/21/2024

## 2024-01-22 DIAGNOSIS — E03.9 HYPOTHYROIDISM, UNSPECIFIED TYPE: ICD-10-CM

## 2024-01-22 RX ORDER — LEVOTHYROXINE SODIUM 125 UG/1
TABLET ORAL
Qty: 90 TABLET | Refills: 3 | OUTPATIENT
Start: 2024-01-22

## 2024-01-22 NOTE — TELEPHONE ENCOUNTER
Refill Decision Note   Kadi Blackman  is requesting a refill authorization.  Brief Assessment and Rationale for Refill:  Quick Discontinue     Medication Therapy Plan:         Comments:     Note composed:3:35 PM 01/22/2024               
No care due was identified.  [facility] Embedded Care Due Messages. Reference number: [id number].   1/22/2024 8:26:50 AM CST  
Solomon Marte)  Orthopaedic Surgery  [address] [place] [address], [address]  [place] [address]  Phone: [phone]  Fax: [phone]  Follow Up Time: 1-3 Days

## 2024-02-07 ENCOUNTER — HOSPITAL ENCOUNTER (OUTPATIENT)
Dept: RADIOLOGY | Facility: HOSPITAL | Age: 74
Discharge: HOME | End: 2024-02-07
Attending: INTERNAL MEDICINE
Payer: MEDICARE

## 2024-02-07 DIAGNOSIS — M85.80 OSTEOPENIA, UNSPECIFIED LOCATION: ICD-10-CM

## 2024-02-07 DIAGNOSIS — Z12.31 ENCOUNTER FOR SCREENING MAMMOGRAM FOR MALIGNANT NEOPLASM OF BREAST: ICD-10-CM

## 2024-02-07 DIAGNOSIS — Z78.0 ASYMPTOMATIC POSTMENOPAUSAL STATE: ICD-10-CM

## 2024-02-07 PROCEDURE — 77063 BREAST TOMOSYNTHESIS BI: CPT | Mod: 26,,, | Performed by: RADIOLOGY

## 2024-02-07 PROCEDURE — 77067 SCR MAMMO BI INCL CAD: CPT | Mod: TC,PO

## 2024-02-07 PROCEDURE — 77080 DXA BONE DENSITY AXIAL: CPT | Mod: TC,PO

## 2024-02-07 PROCEDURE — 77080 DXA BONE DENSITY AXIAL: CPT | Mod: 26,,, | Performed by: RADIOLOGY

## 2024-02-07 PROCEDURE — 77067 SCR MAMMO BI INCL CAD: CPT | Mod: 26,,, | Performed by: RADIOLOGY

## 2024-02-19 DIAGNOSIS — F41.9 ANXIETY: ICD-10-CM

## 2024-02-19 NOTE — TELEPHONE ENCOUNTER
No care due was identified.  [facility] Embedded Care Due Messages. Reference number: [id number].   2/19/2024 11:04:18 AM CST

## 2024-02-19 NOTE — TELEPHONE ENCOUNTER
Needs fluticasone-salmeterol (ADVAIR DISKUS) 100-50 MCG/DOSE Inhalation Aerosol instead of one per month to 2 per month. RX Pended  LOV: 1/3/24  NOV: n/a

## 2024-02-20 RX ORDER — FLUOXETINE HYDROCHLORIDE 20 MG/1
20 CAPSULE ORAL DAILY
Qty: 90 CAPSULE | Refills: 3 | Status: SHIPPED | OUTPATIENT
Start: 2024-02-20

## 2025-01-17 DIAGNOSIS — E03.9 HYPOTHYROIDISM, UNSPECIFIED TYPE: ICD-10-CM

## 2025-01-17 RX ORDER — LEVOTHYROXINE SODIUM 125 UG/1
TABLET ORAL
Qty: 90 TABLET | OUTPATIENT
Start: 2025-01-17

## 2025-01-17 NOTE — TELEPHONE ENCOUNTER
Refill Decision Note   Kadi Blackman  is requesting a refill authorization.    Brief Assessment and Rationale for Refill:   Quick Discontinue       Medication Therapy Plan:   E-Prescribing Status: Receipt confirmed by pharmacy (1/17/2025  8:14 AM CST)      Comments:     Note composed:12:40 PM 01/17/2025

## 2025-01-17 NOTE — TELEPHONE ENCOUNTER
No care due was identified.  [facility] Embedded Care Due Messages. Reference number: [id number].   1/17/2025 8:21:14 AM CST

## 2025-01-24 DIAGNOSIS — E03.9 HYPOTHYROIDISM, UNSPECIFIED TYPE: ICD-10-CM

## 2025-01-24 RX ORDER — LEVOTHYROXINE SODIUM 125 UG/1
TABLET ORAL
Qty: 90 TABLET | OUTPATIENT
Start: 2025-01-24

## 2025-01-24 NOTE — TELEPHONE ENCOUNTER
No care due was identified.  [facility] Embedded Care Due Messages. Reference number: [id number].   1/24/2025 8:58:43 AM CST

## 2025-01-24 NOTE — TELEPHONE ENCOUNTER
Refill Decision Note   Kadi Blackman  is requesting a refill authorization.  Brief Assessment and Rationale for Refill:  Quick Discontinue     Medication Therapy Plan: E-Prescribing Status: Receipt confirmed by pharmacy (1/22/2025  4:42 PM CST)      Comments:     Note composed:11:42 AM 01/24/2025

## 2025-02-14 DIAGNOSIS — E03.9 HYPOTHYROIDISM, UNSPECIFIED TYPE: ICD-10-CM

## 2025-02-14 NOTE — TELEPHONE ENCOUNTER
Refill Routing Note   Medication(s) are not appropriate for processing by [facility] for the following reason(s):        Required labs outdated    [facility] action(s):  Defer             Appointments  past 12m or future 3m with PCP    Date Provider   Last Visit   1/3/2024 Saskia Cheema MD   Next Visit   Visit date not found Saskia Cheema MD   ED visits in past 90 days: 0        Note composed:8:38 AM 02/14/2025

## 2025-02-14 NOTE — TELEPHONE ENCOUNTER
No care due was identified.  [facility] Embedded Care Due Messages. Reference number: [id number].   2/14/2025 3:22:44 AM CST

## 2025-02-16 DIAGNOSIS — F41.9 ANXIETY: ICD-10-CM

## 2025-02-16 RX ORDER — FLUOXETINE HYDROCHLORIDE 20 MG/1
20 CAPSULE ORAL
Qty: 90 CAPSULE | Refills: 0 | Status: SHIPPED | OUTPATIENT
Start: 2025-02-16

## 2025-02-16 NOTE — TELEPHONE ENCOUNTER
Refill Decision Note   Kadi Blackman  is requesting a refill authorization.  Brief Assessment and Rationale for Refill:  Approve     Medication Therapy Plan:        Comments:     Note composed:10:37 AM 02/16/2025

## 2025-02-16 NOTE — TELEPHONE ENCOUNTER
No care due was identified.  [facility] Embedded Care Due Messages. Reference number: [id number].   2/16/2025 10:01:12 AM CST

## 2025-02-18 RX ORDER — LEVOTHYROXINE SODIUM 125 UG/1
TABLET ORAL
Qty: 90 TABLET | Refills: 0 | Status: SHIPPED | OUTPATIENT
Start: 2025-02-18

## 2025-02-19 ENCOUNTER — LAB VISIT (OUTPATIENT)
Dept: LAB | Facility: HOSPITAL | Age: 75
End: 2025-02-19
Payer: MEDICARE

## 2025-02-19 DIAGNOSIS — E55.9 VITAMIN D DEFICIENCY: ICD-10-CM

## 2025-02-19 DIAGNOSIS — R31.21 ASYMPTOMATIC MICROSCOPIC HEMATURIA: ICD-10-CM

## 2025-02-19 DIAGNOSIS — E03.9 HYPOTHYROIDISM, UNSPECIFIED TYPE: ICD-10-CM

## 2025-02-19 DIAGNOSIS — Z13.6 ENCOUNTER FOR SCREENING FOR CARDIOVASCULAR DISORDERS: ICD-10-CM

## 2025-02-19 LAB
25(OH)D3+25(OH)D2 SERPL-MCNC: 28 NG/ML (ref 30–96)
ALBUMIN SERPL BCP-MCNC: 3.9 G/DL (ref 3.5–5.2)
ALP SERPL-CCNC: 78 U/L (ref 40–150)
ALT SERPL W/O P-5'-P-CCNC: 20 U/L (ref 10–44)
ANION GAP SERPL CALC-SCNC: 11 MMOL/L (ref 8–16)
AST SERPL-CCNC: 36 U/L (ref 10–40)
BILIRUB SERPL-MCNC: 0.5 MG/DL (ref 0.1–1)
BILIRUB UR QL STRIP: NEGATIVE
BUN SERPL-MCNC: 17 MG/DL (ref 8–23)
CALCIUM SERPL-MCNC: 9.9 MG/DL (ref 8.7–10.5)
CHLORIDE SERPL-SCNC: 104 MMOL/L (ref 95–110)
CHOLEST SERPL-MCNC: 207 MG/DL (ref 120–199)
CHOLEST/HDLC SERPL: 2.5 {RATIO} (ref 2–5)
CLARITY UR REFRACT.AUTO: CLEAR
CO2 SERPL-SCNC: 24 MMOL/L (ref 23–29)
COLOR UR AUTO: YELLOW
CREAT SERPL-MCNC: 0.7 MG/DL (ref 0.5–1.4)
EST. GFR  (NO RACE VARIABLE): >60 ML/MIN/1.73 M^2
GLUCOSE SERPL-MCNC: 90 MG/DL (ref 70–110)
GLUCOSE UR QL STRIP: NEGATIVE
HDLC SERPL-MCNC: 83 MG/DL (ref 40–75)
HDLC SERPL: 40.1 % (ref 20–50)
HGB UR QL STRIP: ABNORMAL
KETONES UR QL STRIP: NEGATIVE
LDLC SERPL CALC-MCNC: 115 MG/DL (ref 63–159)
LEUKOCYTE ESTERASE UR QL STRIP: NEGATIVE
NITRITE UR QL STRIP: NEGATIVE
NONHDLC SERPL-MCNC: 124 MG/DL
PH UR STRIP: 8 [PH] (ref 5–8)
POTASSIUM SERPL-SCNC: 4.3 MMOL/L (ref 3.5–5.1)
PROT SERPL-MCNC: 7.3 G/DL (ref 6–8.4)
PROT UR QL STRIP: NEGATIVE
SODIUM SERPL-SCNC: 139 MMOL/L (ref 136–145)
SP GR UR STRIP: 1.01 (ref 1–1.03)
T4 FREE SERPL-MCNC: 1.7 NG/DL (ref 0.71–1.51)
TRIGL SERPL-MCNC: 45 MG/DL (ref 30–150)
TSH SERPL DL<=0.005 MIU/L-ACNC: 1.34 UIU/ML (ref 0.4–4)
URN SPEC COLLECT METH UR: ABNORMAL

## 2025-02-19 PROCEDURE — 80053 COMPREHEN METABOLIC PANEL: CPT | Performed by: INTERNAL MEDICINE

## 2025-02-19 PROCEDURE — 84443 ASSAY THYROID STIM HORMONE: CPT | Performed by: INTERNAL MEDICINE

## 2025-02-19 PROCEDURE — 80061 LIPID PANEL: CPT | Performed by: INTERNAL MEDICINE

## 2025-02-19 PROCEDURE — 36415 COLL VENOUS BLD VENIPUNCTURE: CPT | Mod: PN | Performed by: INTERNAL MEDICINE

## 2025-02-19 PROCEDURE — 82306 VITAMIN D 25 HYDROXY: CPT | Performed by: INTERNAL MEDICINE

## 2025-02-19 PROCEDURE — 84439 ASSAY OF FREE THYROXINE: CPT | Performed by: INTERNAL MEDICINE

## 2025-02-19 PROCEDURE — 81003 URINALYSIS AUTO W/O SCOPE: CPT | Performed by: INTERNAL MEDICINE

## 2025-02-20 ENCOUNTER — RESULTS FOLLOW-UP (OUTPATIENT)
Dept: FAMILY MEDICINE | Facility: CLINIC | Age: 75
End: 2025-02-20

## 2025-02-22 DIAGNOSIS — Z00.00 ENCOUNTER FOR MEDICARE ANNUAL WELLNESS EXAM: ICD-10-CM

## 2025-03-11 ENCOUNTER — RESULTS FOLLOW-UP (OUTPATIENT)
Dept: FAMILY MEDICINE | Facility: CLINIC | Age: 75
End: 2025-03-11

## 2025-03-11 ENCOUNTER — HOSPITAL ENCOUNTER (OUTPATIENT)
Dept: RADIOLOGY | Facility: HOSPITAL | Age: 75
Discharge: HOME | End: 2025-03-11
Attending: INTERNAL MEDICINE
Payer: MEDICARE

## 2025-03-11 ENCOUNTER — OFFICE VISIT (OUTPATIENT)
Dept: FAMILY MEDICINE | Facility: CLINIC | Age: 75
End: 2025-03-11
Payer: MEDICARE

## 2025-03-11 VITALS
BODY MASS INDEX: 25.2 KG/M2 | DIASTOLIC BLOOD PRESSURE: 70 MMHG | OXYGEN SATURATION: 98 % | WEIGHT: 147.63 LBS | HEART RATE: 72 BPM | HEIGHT: 64 IN | SYSTOLIC BLOOD PRESSURE: 130 MMHG

## 2025-03-11 DIAGNOSIS — Z12.31 ENCOUNTER FOR SCREENING MAMMOGRAM FOR MALIGNANT NEOPLASM OF BREAST: ICD-10-CM

## 2025-03-11 DIAGNOSIS — M25.561 CHRONIC PAIN OF RIGHT KNEE: ICD-10-CM

## 2025-03-11 DIAGNOSIS — G89.29 CHRONIC PAIN OF RIGHT KNEE: ICD-10-CM

## 2025-03-11 DIAGNOSIS — E03.9 HYPOTHYROIDISM, UNSPECIFIED TYPE: ICD-10-CM

## 2025-03-11 DIAGNOSIS — F41.9 ANXIETY: Primary | ICD-10-CM

## 2025-03-11 DIAGNOSIS — M54.31 RIGHT SIDED SCIATICA: ICD-10-CM

## 2025-03-11 PROCEDURE — 73562 X-RAY EXAM OF KNEE 3: CPT | Mod: 26,59,LT, | Performed by: RADIOLOGY

## 2025-03-11 PROCEDURE — 72100 X-RAY EXAM L-S SPINE 2/3 VWS: CPT | Mod: 26,,, | Performed by: RADIOLOGY

## 2025-03-11 PROCEDURE — 73562 X-RAY EXAM OF KNEE 3: CPT | Mod: TC,PN,LT

## 2025-03-11 PROCEDURE — 99214 OFFICE O/P EST MOD 30 MIN: CPT | Mod: S$PBB,,, | Performed by: INTERNAL MEDICINE

## 2025-03-11 PROCEDURE — 72100 X-RAY EXAM L-S SPINE 2/3 VWS: CPT | Mod: TC,PN

## 2025-03-11 PROCEDURE — 73564 X-RAY EXAM KNEE 4 OR MORE: CPT | Mod: 26,RT,, | Performed by: RADIOLOGY

## 2025-03-11 PROCEDURE — G2211 COMPLEX E/M VISIT ADD ON: HCPCS | Mod: S$PBB,,, | Performed by: INTERNAL MEDICINE

## 2025-03-11 PROCEDURE — 99214 OFFICE O/P EST MOD 30 MIN: CPT | Mod: PBBFAC,25,PN | Performed by: INTERNAL MEDICINE

## 2025-03-11 PROCEDURE — 99999 PR PBB SHADOW E&M-EST. PATIENT-LVL IV: CPT | Mod: PBBFAC,,, | Performed by: INTERNAL MEDICINE

## 2025-03-11 RX ORDER — LEVOTHYROXINE SODIUM 125 UG/1
TABLET ORAL
Qty: 90 TABLET | Refills: 3 | Status: SHIPPED | OUTPATIENT
Start: 2025-03-11

## 2025-03-11 RX ORDER — FLUOXETINE HYDROCHLORIDE 20 MG/1
20 CAPSULE ORAL DAILY
Qty: 90 CAPSULE | Refills: 3 | Status: SHIPPED | OUTPATIENT
Start: 2025-03-11

## 2025-03-11 NOTE — PROGRESS NOTES
Assessment and Plan:    1. Anxiety (Primary)  Controlled, continue fluoxetine  - FLUoxetine 20 MG capsule; Take 1 capsule (20 mg total) by mouth once daily.  Dispense: 90 capsule; Refill: 3    2. Hypothyroidism, unspecified type  T4 slightly high but TSH in normal range.  No symptoms.  We will continue current dose of levothyroxine.  - levothyroxine (SYNTHROID) 125 MCG tablet; TAKE 1 TABLET(125 MCG) BY MOUTH EVERY MORNING BEFORE BREAKFAST  Dispense: 90 tablet; Refill: 3    3. Chronic pain of right knee  X-ray and PT ordered  - X-ray Knee Ortho Right with Flexion (XPD); Future    4. Right sided sciatica  X-ray and PT ordered  - X-Ray Lumbar Spine AP And Lateral; Future    5. Encounter for screening mammogram for malignant neoplasm of breast  - Mammo Digital Screening Bilat w/ Ravi (XPD); Future      Visit today included increased complexity associated with the care of the episodic problems listed above, including addressing and managing the longitudinal care of the patient due to the serious and/or complex managed problem(s).    ______________________________________________________________________  Subjective:    Chief Complaint:  Follow up chronic medical conditions.    HPI:  Kadi is a 75 y.o. year old female here to follow up chronic medical conditions.     Anxiety- Taking fluoxetine 20 mg daily. Feels that this is working well. No side effects.      Hypothyroidism- Taking levothyroxine 125 daily. Taking this correctly. Recent TSH normal.     Osteopenia- Not on medication. Recent vitamin D level 28.    She would like to also discuss some pain she has been having in her right leg.  She reports that the pain is predominantly located in her right knee.  Notes that with twisting of her knee she will feel a crunching sensation.  Sometimes has sharp pain in her right knee.  She additionally notes that she sometimes has pain that goes the full extent of her left leg.  Starts in the right gluteal region and will  "radiate down as far as her lower calf.  She does have a history of right-sided sciatica.    Medications:  Medications Ordered Prior to Encounter[1]    Review of Systems:  Review of Systems   Constitutional:  Negative for activity change and unexpected weight change.   HENT:  Negative for hearing loss, rhinorrhea and trouble swallowing.    Eyes:  Negative for discharge and visual disturbance.   Respiratory:  Negative for chest tightness and wheezing.    Cardiovascular:  Negative for chest pain and palpitations.   Gastrointestinal:  Negative for blood in stool, constipation, diarrhea and vomiting.   Endocrine: Negative for polydipsia and polyuria.   Genitourinary:  Negative for difficulty urinating, dysuria, hematuria and menstrual problem.   Musculoskeletal:  Positive for arthralgias, back pain and joint swelling. Negative for neck pain.   Neurological:  Negative for weakness and headaches.   Psychiatric/Behavioral:  Negative for confusion and dysphoric mood.      Entered by patient and reviewed and updated during visit      Past Medical History:  Past Medical History:   Diagnosis Date    Depression     Hyperlipidemia     Osteopenia     Thyroid disease        Objective:    Vitals:  Vitals:    03/11/25 1601   BP: 130/70   Pulse: 72   SpO2: 98%   Weight: 67 kg (147 lb 9.6 oz)   Height: 5' 4" (1.626 m)   PainSc: 0-No pain       Physical Exam  Vitals reviewed.   Constitutional:       General: She is not in acute distress.     Appearance: She is well-developed.   Eyes:      General:         Right eye: No discharge.         Left eye: No discharge.      Conjunctiva/sclera: Conjunctivae normal.   Cardiovascular:      Rate and Rhythm: Normal rate and regular rhythm.   Pulmonary:      Effort: Pulmonary effort is normal. No respiratory distress.   Musculoskeletal:      Comments: Normal range of motion of lumbar spine.  Negative straight leg raise on right.  Right knee has no effusion is not tender to palpation.  No ligamentous " laxity.  She does have slight valgus deviation of the right knee.   Skin:     General: Skin is warm and dry.   Neurological:      Mental Status: She is alert and oriented to person, place, and time.   Psychiatric:         Behavior: Behavior normal.         Thought Content: Thought content normal.         Judgment: Judgment normal.         Data:  TSH normal   Vitamin-D 28    Saskia Cheema MD  Internal Medicine         [1]   Current Outpatient Medications on File Prior to Visit   Medication Sig Dispense Refill    FLUoxetine 20 MG capsule TAKE 1 CAPSULE(20 MG) BY MOUTH EVERY DAY 90 capsule 0    levothyroxine (SYNTHROID) 125 MCG tablet TAKE 1 TABLET(125 MCG) BY MOUTH EVERY MORNING BEFORE BREAKFAST 90 tablet 0     No current facility-administered medications on file prior to visit.

## 2025-03-20 ENCOUNTER — CLINICAL SUPPORT (OUTPATIENT)
Dept: REHABILITATION | Facility: HOSPITAL | Age: 75
End: 2025-03-20
Attending: INTERNAL MEDICINE
Payer: MEDICARE

## 2025-03-20 DIAGNOSIS — M25.561 CHRONIC PAIN OF RIGHT KNEE: ICD-10-CM

## 2025-03-20 DIAGNOSIS — G89.29 CHRONIC PAIN OF RIGHT KNEE: ICD-10-CM

## 2025-03-20 DIAGNOSIS — M54.31 RIGHT SIDED SCIATICA: ICD-10-CM

## 2025-03-20 DIAGNOSIS — M25.661 DECREASED RANGE OF MOTION (ROM) OF RIGHT KNEE: ICD-10-CM

## 2025-03-20 DIAGNOSIS — R26.9 GAIT DIFFICULTY: Primary | ICD-10-CM

## 2025-03-20 PROCEDURE — 97110 THERAPEUTIC EXERCISES: CPT | Mod: PN

## 2025-03-20 PROCEDURE — 97162 PT EVAL MOD COMPLEX 30 MIN: CPT | Mod: PN

## 2025-03-20 NOTE — PROGRESS NOTES
Outpatient Rehab    Physical Therapy Evaluation    Patient Name: Carlos Blackman  MRN: [id number]  YOB: 1950  Encounter Date: 3/20/2025    Therapy Diagnosis:   Encounter Diagnoses   Name Primary?    Chronic pain of right knee     Right sided sciatica     Gait difficulty Yes    Decreased range of motion (ROM) of right knee      Physician: Saskia Cheema MD    Physician Orders: Eval and Treat  Medical Diagnosis: Chronic pain of right knee  Right sided sciatica    Visit # / Visits Authorized:  1 / 1  Date of Evaluation: 3/20/2025  Insurance Authorization Period: 3/11/2025 to 3/11/2026  Plan of Care Certification:  3/20/2025 to 5/16/2025      PT/PTA:     Number of PTA visits since last PT visit:   Time In: 1302   Time Out: 1400  Total Time: 58       Intake Outcome Measure for FOTO Survey    Therapist reviewed FOTO scores for Carlos Blackman on 3/20/2025.   FOTO report - see Media section or FOTO account episode details.     Intake Score: 59%         Subjective   History of Present Illness  Carlos is a 75 y.o. female who reports to physical therapy with a chief concern of Right knee and right LE pain.         Diagnostic tests related to this condition: X-ray.   X-Ray Details: subluxation of L3 on L4 and L4 on L5.  Sagittal lumbar alignment appears grossly maintained, noting somewhat suboptimal visualization of the vertebral bodies due to superimposition of tissues.  Vertebral body heights appear grossly maintained.  No displaced compression fracture.  Mild multilevel degenerative changes with intervertebral disc space narrowing and facet arthropathy, most pronounced at L5-S1.  Calcific atherosclerosis of the abdominal aorta.  Mild degenerative changes of bilateral sacroiliac joints.     Impression:     As above.        Electronically signed by:Aristides Diez  Date:                                            03/11/2025  Time:                                           17:05                                 17:06FINDINGS:  No acute fracture or dislocation.  Mild bilateral knee medial tibiofemoral joint space narrowing.  Small right joint effusion.  Bilateral lateral patellar tilt.     Impression:     As above.        Electronically signed by:Aristides Diez  Date:                                            03/11/2025  Time:                                           17:06    History of Present Condition/Illness: Right knee : about a month ago had a twisting injury to right knee that hasn't improved. Pain is intermittent in nature. She also started to having pain that travels from the right glute down side of leg into lower leg/ankle.    Activities of Daily Living  Social history was obtained from Patient.               Previously independent with activities of daily living? Yes     Currently independent with activities of daily living? Yes          Previously independent with instrumental activities of daily living? Yes     Currently independent with instrumental activities of daily living? Yes              Pain     Patient reports a current pain level of 0/10. Pain at best is reported as 0/10. Pain at worst is reported as 7/10.   Location: Anterior knee pain, right glute to lateral leg into ankle  Clinical Progression (since onset): Worsening  Pain Qualities: Aching, Dull, Grinding, Needle-like  Pain-Relieving Factors: Walking, Rest, Movement  Pain-Aggravating Factors: Walking, Sitting, Standing           Past Medical History/Physical Systems Review:   Kadi Blackman  has a past medical history of Depression, Hyperlipidemia, Osteopenia, and Thyroid disease.    Kadi Blackman  has a past surgical history that includes No past surgeries.    Kadi has a current medication list which includes the following prescription(s): fluoxetine and levothyroxine.    Review of patient's allergies indicates:  No Known Allergies     Objective      Lower Extremity Sensation  Right Lumbar/Lower Extremity Sensation  Intact: Light  Touch       Left Lumbar/Lower Extremity Sensation  Intact: Light Touch                Lumbar Range of Motion   Thoracolmbar ROM: Flexion to knees, Ext 50%, sidebending 75%, rotation 75%: pain at end range but no radicular symptoms reported      Hip Range of Motion    Bilateral Hip ROM WNL except lacking some R external rotation compared to left    Knee Range of Motion   Right Knee   Active (deg) Passive (deg) Pain   Flexion 120       Extension 5           Left Knee   Active (deg) Passive (deg) Pain   Flexion 120       Extension 0                Ankle/Foot Range of Motion      Bilateral ankle ROM WNL              Hip Strength - Planes of Motion   Right Strength Right Pain Left Strength Left  Pain   Flexion (L2) 4+   5     Extension           ABduction 4+   5     ADduction 5   5     Internal Rotation           External Rotation               Knee Strength   Right Strength Right Pain Left Strength Left  Pain   Flexion (S2) 4+   5     Prone Flexion           Extension (L3) 4+   5            Ankle/Foot Strength - Planes of Motion   Right Strength Right Pain Left Strength Left  Pain   Dorsiflexion (L4) 5   5     Plantar Flexion (S1)           Inversion 5   5     Eversion 5   5     Great Toe Flexion           Great Toe Extension (L5)           Lesser Toes Flexion           Lesser Toes Extension                  Hip Special Tests  Straight Leg Raise Test  Negative: Right and Left                    Gait Analysis  Base of Support: Normal  Gait Pattern: Antalgic  Walking Speed: Decreased    Right Side Walking Observations  Decreased: Stance Time and Step Length                 Treatment:  Therapeutic Exercise  TE 1: Knee extension challanges: x 10  TE 2: knee quad set witj erwin on towel roll x 10, 5 sec holds  TE 3: Supine trunk rotations x 10  TE 4: Pelvic tilts x 10      Time Entry(in minutes):  PT Evaluation (Moderate) Time Entry: 40  Therapeutic Exercise Time Entry: 18    Assessment & Plan   Assessment  Carlos ny  with a condition of Moderate complexity.   Presentation of Symptoms: Evolving       Functional Limitations: Activity tolerance, Decreased ambulation distance/endurance, Gait limitations, Painful locomotion/ambulation, Pain with ADLs/IADLs, Range of motion, Squatting  Impairments: Abnormal gait, Activity intolerance, Lack of appropriate home exercise program  Personal Factors Affecting Prognosis: Pain    Prognosis: Good  Assessment Details: Patient with recent knee injury that sounds like through off her biomechanics and lead to her pain radicular type pain in right LE from glute to ankle. She has some expected limitations in thoracolumbar ROM but was unable to reproduce radicular symptoms. Symptoms can be unpredictable and intense . She does report being less active prior to knee injury compared to her previous activity level . She is lacking terminal extension in the right knee which can definitely affect her gait patten and cause issues up the chain. Patient was started on a HEP today to work on both leg symptoms and knee pain. We also discussed possible dry needling as a treatment option.     Plan  From a physical therapy perspective, the patient would benefit from: Skilled Rehab Services    Planned therapy interventions include: Therapeutic exercise, Therapeutic activities, Neuromuscular re-education, Manual therapy, ADLs/IADLs, and Gait training.    Planned modalities to include: Other (Comment). Dry needling            Other/tapered frequency details: Patient will be seen 2 times a week for the next few weeks and will see how she does    This plan was discussed with Patient.   Discussion participants: Agreed Upon Plan of Care  Plan details: Outpatient Physical Therapy to include the following interventions: Cervical/Lumbar Traction, Electrical Stimulation re-eval, dry needling, Gait Training, Iontophoresis (with ), Manual Therapy, Moist Heat/ Ice, Neuromuscular Re-ed, Patient Education, Self Care, Therapeutic  Activities, and Therapeutic Exercise. Physical therapist and physical therapy assistant(s) will met face to face to discuss patient's treatment plan and progress towards established goals. Pt will be seen by a physical therapist minimally every 6th visit or every 30 days. Potential for KX modifier and additional visits based on subjective report, objective examination, nature of current condition, comorbidities, and current functional status.            Patient's spiritual, cultural, and educational needs considered and patient agreeable to plan of care and goals.           Goals:   Active       Long term goal       Normal gait pattern w/o pain       Start:  03/21/25    Expected End:  05/16/25            No RLE radicular type pain       Start:  03/21/25    Expected End:  05/16/25            Independent with long term HEP       Start:  03/21/25    Expected End:  05/16/25               Short term Goals       Decrease pain with ambulation by 2 points or more       Start:  03/21/25    Expected End:  04/11/25            Decrease RLE pain to mid thigh or higher       Start:  03/21/25    Expected End:  04/11/25            Full R knee extension       Start:  03/21/25    Expected End:  04/11/25                Jessee Brooks, PT

## 2025-03-21 PROBLEM — M25.661 DECREASED RANGE OF MOTION (ROM) OF RIGHT KNEE: Status: ACTIVE | Noted: 2025-03-21

## 2025-03-21 PROBLEM — M25.561 RIGHT KNEE PAIN: Status: ACTIVE | Noted: 2025-03-21

## 2025-03-21 PROBLEM — R26.9 GAIT DIFFICULTY: Status: ACTIVE | Noted: 2025-03-21

## 2025-03-26 NOTE — PROGRESS NOTES
Outpatient Rehab    Physical Therapy Visit    Patient Name: Carlos Blackman  MRN: [id number]  YOB: 1950  Encounter Date: 3/27/2025    Therapy Diagnosis:   Encounter Diagnoses   Name Primary?    Gait difficulty Yes    Acute pain of right knee      Physician: Saskia Cheema MD    Physician Orders: Eval and Treat  Medical Diagnosis: Chronic pain of right knee  Right sided sciatica  Date of Evaluation: 3/20/2025  Insurance Authorization Period: 3/11/2025 to 3/11/2026  Plan of Care Certification:  3/20/2025 to 5/16/2025                 Visit # / Visits Authorized:  1 / 10  Insurance Authorization Period: 3/19/2025 to 12/31/2025       PT/PTA:     Number of PTA visits since last PT visit:   Time In: 1300   Time Out: 1355  Total Time: 55   Total Billable Time:      FOTO:  Intake Score: 59%  Survey Score 1:  %  Survey Score 2:  %         Subjective   Patient reports she is doing a lot better with minimal if any pain to knee and right LE after doing ex for a couple days.  Pain reported as 0/10.      Objective            Treatment:  Therapeutic Exercise  TE 1: Knee extension challanges: x 10  TE 2: knee quad set witj erwin on towel roll x 10, 5 sec holds  TE 3: Supine trunk rotations x 10  TE 4: Pelvic tilts x 10  TE 5: SLR: with quad set x 10  TE 6: DF/hamstring stretch x 10 x 10 sec hols: SLR  TE 7: Supine bridge: x 10  TE 8: side lying hip Abd x 10: Bilateral  Manual Therapy  MT 1: patella mobs: Medial: Grade: II-IV  MT 2: Knee: posterior/anterior for knee ext: Geade III-IV  MT 3: Proximal tib-fib jnt posterior mobs; Grade III-IV      Time Entry(in minutes):  Manual Therapy Time Entry: 15  Therapeutic Exercise Time Entry: 40    Assessment & Plan   Assessment: Patient is doing great and reports being painfree mostly now in her knee and RLE. She still is lacking some terminal extension but less and its not as painful, She was advanced with her exercises and also mentioned she would like to work on some balance  to help get her more steady.  Evaluation/Treatment Tolerance: Patient tolerated treatment well    Patient will continue to benefit from skilled outpatient physical therapy to address the deficits listed in the problem list box on initial evaluation, provide pt/family education and to maximize pt's level of independence in the home and community environment.     Patient's spiritual, cultural, and educational needs considered and patient agreeable to plan of care and goals.           Plan:      Goals:   Active       Long term goal       Normal gait pattern w/o pain       Start:  03/21/25    Expected End:  05/16/25            No RLE radicular type pain       Start:  03/21/25    Expected End:  05/16/25            Independent with long term HEP       Start:  03/21/25    Expected End:  05/16/25               Short term Goals       Decrease pain with ambulation by 2 points or more       Start:  03/21/25    Expected End:  04/11/25            Decrease RLE pain to mid thigh or higher       Start:  03/21/25    Expected End:  04/11/25            Full R knee extension       Start:  03/21/25    Expected End:  04/11/25                Jessee Brooks, PT

## 2025-03-27 ENCOUNTER — CLINICAL SUPPORT (OUTPATIENT)
Dept: REHABILITATION | Facility: HOSPITAL | Age: 75
End: 2025-03-27
Payer: MEDICARE

## 2025-03-27 DIAGNOSIS — M25.561 ACUTE PAIN OF RIGHT KNEE: ICD-10-CM

## 2025-03-27 DIAGNOSIS — R26.9 GAIT DIFFICULTY: Primary | ICD-10-CM

## 2025-03-27 PROCEDURE — 97140 MANUAL THERAPY 1/> REGIONS: CPT | Mod: PN

## 2025-03-27 PROCEDURE — 97110 THERAPEUTIC EXERCISES: CPT | Mod: PN

## 2025-04-02 ENCOUNTER — CLINICAL SUPPORT (OUTPATIENT)
Dept: REHABILITATION | Facility: HOSPITAL | Age: 75
End: 2025-04-02
Payer: MEDICARE

## 2025-04-02 DIAGNOSIS — M25.561 ACUTE PAIN OF RIGHT KNEE: ICD-10-CM

## 2025-04-02 DIAGNOSIS — R26.9 GAIT DIFFICULTY: Primary | ICD-10-CM

## 2025-04-02 PROCEDURE — 97110 THERAPEUTIC EXERCISES: CPT | Mod: PN

## 2025-04-02 PROCEDURE — 97140 MANUAL THERAPY 1/> REGIONS: CPT | Mod: PN

## 2025-04-02 NOTE — PROGRESS NOTES
Outpatient Rehab    Physical Therapy Visit    Patient Name: Carlos Blackman  MRN: [id number]  YOB: 1950  Encounter Date: 4/2/2025    Therapy Diagnosis:   Encounter Diagnoses   Name Primary?    Gait difficulty Yes    Acute pain of right knee      Physician: Saskia Cheema MD    Physician Orders: Eval and Treat  Medical Diagnosis: Chronic pain of right knee  Right sided sciatica    Visit # / Visits Authorized:  2 / 10  Insurance Authorization Period: 3/19/2025 to 12/31/2025  Insurance Authorization Period: 3/11/2025 to 3/11/2026  Plan of Care Certification:  3/20/2025 to 5/16/2025        PT/PTA:     Number of PTA visits since last PT visit:   Time In: 1300   Time Out: 1358  Total Time: 58   Total Billable Time:      FOTO:  Intake Score:  %  Survey Score 1: 76.3%  Survey Score 2:  %         Subjective   Patient reports she has been doing great and hasn't been having any pain in her knee with walking or activity now. No issues with her exercises at home. she has been working on getting it straight but it isn't quite there yet..  Pain reported as 0/10.      Objective            Treatment:  Therapeutic Exercise  TE 1: Knee extension challanges: x 10  TE 2: knee quad set with Heel on towel roll x 10, 5 sec holds  TE 7: standing: TKE against yellow therband x 10: Bilateral  TE 8: side lying hip Abd x 10: Bilateral  Manual Therapy  MT 1: patella mobs: Medial: Grade: II-IV  MT 2: Knee: posterior/anterior for knee ext: Geade III-IV  MT 3: Proximal tib-fib jnt posterior mobs; Grade III-IV  MT 4: Tibial long axis distraction      Time Entry(in minutes):  Manual Therapy Time Entry: 35  Therapeutic Exercise Time Entry: 25    Assessment & Plan   Assessment: Patient continues to do well without pain with ADLs or ambulation. She still is lacking a few degrees of terminal extension but I am able to get her there passively w/o pain. She has done well with her HEP. I am going to have her continue on a HEP for the next 2  weeks and then see her. I added Terminal knee ext exeercises with yellow theraband to her home program.  Evaluation/Treatment Tolerance: Patient tolerated treatment well    Patient will continue to benefit from skilled outpatient physical therapy to address the deficits listed in the problem list box on initial evaluation, provide pt/family education and to maximize pt's level of independence in the home and community environment.     Patient's spiritual, cultural, and educational needs considered and patient agreeable to plan of care and goals.           Plan: Outpatient Physical Therapy to include the following interventions: Cervical/Lumbar Traction, Electrical Stimulation re-eval, dry needling, Gait Training, Iontophoresis (with ), Manual Therapy, Moist Heat/ Ice, Neuromuscular Re-ed, Patient Education, Self Care, Therapeutic Activities, and Therapeutic Exercise. Physical therapist and physical therapy assistant(s) will met face to face to discuss patient's treatment plan and progress towards established goals. Pt will be seen by a physical therapist minimally every 6th visit or every 30 days. Potential for KX modifier and additional visits based on subjective report, objective examination, nature of current condition, comorbidities, and current functional status.    Goals:   Active       Long term goal       Normal gait pattern w/o pain (Met)       Start:  03/21/25    Expected End:  05/16/25    Resolved:  04/02/25         No RLE radicular type pain (Met)       Start:  03/21/25    Expected End:  05/16/25    Resolved:  04/02/25         Independent with long term HEP (Progressing)       Start:  03/21/25    Expected End:  05/16/25               Short term Goals       Decrease pain with ambulation by 2 points or more (Met)       Start:  03/21/25    Expected End:  04/11/25    Resolved:  03/27/25         Decrease RLE pain to mid thigh or higher (Met)       Start:  03/21/25    Expected End:  04/11/25    Resolved:   03/27/25         Full R knee extension (Progressing)       Start:  03/21/25    Expected End:  04/11/25                Jessee Brooks, PT

## 2025-05-15 DIAGNOSIS — F41.9 ANXIETY: ICD-10-CM

## 2025-05-15 RX ORDER — FLUOXETINE 20 MG/1
20 CAPSULE ORAL
Qty: 90 CAPSULE | Refills: 3 | Status: SHIPPED | OUTPATIENT
Start: 2025-05-15

## 2025-05-15 NOTE — TELEPHONE ENCOUNTER
Refill Decision Note   Kadi Blackman  is requesting a refill authorization.  Brief Assessment and Rationale for Refill:  Approve     Medication Therapy Plan:         Comments:     Note composed:9:51 AM 05/15/2025

## 2025-05-15 NOTE — TELEPHONE ENCOUNTER
No care due was identified.  [facility] Embedded Care Due Messages. Reference number: [id number].   5/15/2025 3:22:46 AM CDT

## 2025-07-01 ENCOUNTER — PATIENT MESSAGE (OUTPATIENT)
Dept: FAMILY MEDICINE | Facility: CLINIC | Age: 75
End: 2025-07-01
Payer: COMMERCIAL

## 2025-07-09 ENCOUNTER — OFFICE VISIT (OUTPATIENT)
Dept: FAMILY MEDICINE | Facility: CLINIC | Age: 75
End: 2025-07-09
Payer: MEDICARE

## 2025-07-09 VITALS — HEIGHT: 64 IN | WEIGHT: 147.69 LBS | BODY MASS INDEX: 25.21 KG/M2

## 2025-07-09 DIAGNOSIS — Z78.9 CONSUMES A VEGAN DIET: ICD-10-CM

## 2025-07-09 DIAGNOSIS — E03.9 HYPOTHYROIDISM, UNSPECIFIED TYPE: Primary | ICD-10-CM

## 2025-07-09 DIAGNOSIS — R53.82 CHRONIC FATIGUE: ICD-10-CM

## 2025-07-09 DIAGNOSIS — Z79.899 OTHER LONG TERM (CURRENT) DRUG THERAPY: ICD-10-CM

## 2025-07-09 NOTE — PROGRESS NOTES
Assessment and Plan:    1. Hypothyroidism, unspecified type (Primary)  - TSH; Future  - T4, Free; Future    2. Consumes a vegan diet  - CBC Without Differential; Future  - Vitamin B12; Future    3. Chronic fatigue  - TSH; Future  - T4, Free; Future  - CBC Without Differential; Future  - Vitamin B12; Future    4. Other long term (current) drug therapy  - Vitamin B12; Future    ______________________________________________________________________  Subjective:    Chief Complaint:  Discuss thyroid    HPI:  Kadi is a 75 y.o. year old female patient with telemedicine visit today as consultation to discuss thyroid labs.    The patient location is: home in [place]  The chief complaint leading to consultation is: as above    Visit type: audiovisual    Face to Face time with patient: 15 minutes  20 minutes of total time spent on the encounter, which includes face to face time and non-face to face time preparing to see the patient (eg, review of tests), Obtaining and/or reviewing separately obtained history, Documenting clinical information in the electronic or other health record, Independently interpreting results (not separately reported) and communicating results to the patient/family/caregiver, or Care coordination (not separately reported).         Each patient to whom he or she provides medical services by telemedicine is:  (1) informed of the relationship between the physician and patient and the respective role of any other health care provider with respect to management of the patient; and (2) notified that he or she may decline to receive medical services by telemedicine and may withdraw from such care at any time.    Notes:     She reports that she has been feeling generally fatigued for several months.  Notes that she will feel like she needs to fall asleep sometimes during the day, such as while reading a book.  She reports that when she gets a full night's sleep, she feels well rested.  Reports that she often  "does not sleep enough at night, but not due to insomnia.  Reports that it is usually just that she stays up too late reading or watching a movie and then still needs to get up in the morning.  She reports that she thinks this is the cause of her daytime fatigue, but wanted to have her thyroid checked just in case.    Levothyroxine dose was increased last year after TSH was 11.  Was normal on most recent labs.  Taking 125 mcg daily.  Does not miss doses.  Takes this on an empty stomach every day.    She is taking a vitamin D supplement.    She has not been taking a B12 supplement.  She does eat an entirely vegan diet.  She does take a multivitamin.      Medications:  Medications Ordered Prior to Encounter[1]    Review of Systems:  Review of Systems   Constitutional:  Positive for fatigue. Negative for activity change and unexpected weight change.   HENT:  Negative for hearing loss, rhinorrhea and trouble swallowing.    Eyes:  Negative for discharge and visual disturbance.   Respiratory:  Negative for chest tightness and wheezing.    Cardiovascular:  Negative for chest pain and palpitations.   Gastrointestinal:  Negative for blood in stool, constipation, diarrhea and vomiting.   Endocrine: Negative for polydipsia and polyuria.   Genitourinary:  Negative for difficulty urinating, dysuria, hematuria and menstrual problem.   Musculoskeletal:  Negative for arthralgias, joint swelling and neck pain.   Neurological:  Negative for weakness and headaches.   Psychiatric/Behavioral:  Negative for confusion and dysphoric mood.      Entered by patient and reviewed and updated during visit      Past Medical History:  Past Medical History:   Diagnosis Date    Depression     Hyperlipidemia     Osteopenia     Thyroid disease        Objective:    Vitals:  Vitals:    07/09/25 1037   Weight: 67 kg (147 lb 11.3 oz)   Height: 5' 4" (1.626 m)       Physical Exam  Vitals reviewed.   Constitutional:       General: She is not in acute " distress.     Appearance: She is well-developed.   Eyes:      General:         Right eye: No discharge.         Left eye: No discharge.      Conjunctiva/sclera: Conjunctivae normal.   Cardiovascular:      Rate and Rhythm: Normal rate and regular rhythm.   Pulmonary:      Effort: Pulmonary effort is normal. No respiratory distress.   Skin:     General: Skin is warm and dry.   Neurological:      Mental Status: She is alert and oriented to person, place, and time.   Psychiatric:         Behavior: Behavior normal.         Thought Content: Thought content normal.         Judgment: Judgment normal.       Data:  Last TSH normal with FT4 1.7      Saskia Cheema MD  Internal Medicine                   [1]   Current Outpatient Medications on File Prior to Visit   Medication Sig Dispense Refill    FLUoxetine 20 MG capsule TAKE 1 CAPSULE(20 MG) BY MOUTH EVERY DAY 90 capsule 3    levothyroxine (SYNTHROID) 125 MCG tablet TAKE 1 TABLET(125 MCG) BY MOUTH EVERY MORNING BEFORE BREAKFAST 90 tablet 3     No current facility-administered medications on file prior to visit.

## 2025-07-14 ENCOUNTER — LAB VISIT (OUTPATIENT)
Dept: LAB | Facility: HOSPITAL | Age: 75
End: 2025-07-14
Attending: INTERNAL MEDICINE
Payer: MEDICARE

## 2025-07-14 DIAGNOSIS — Z79.899 OTHER LONG TERM (CURRENT) DRUG THERAPY: ICD-10-CM

## 2025-07-14 DIAGNOSIS — Z78.9 CONSUMES A VEGAN DIET: ICD-10-CM

## 2025-07-14 DIAGNOSIS — E03.9 HYPOTHYROIDISM, UNSPECIFIED TYPE: ICD-10-CM

## 2025-07-14 DIAGNOSIS — R53.82 CHRONIC FATIGUE: ICD-10-CM

## 2025-07-14 LAB
ERYTHROCYTE [DISTWIDTH] IN BLOOD BY AUTOMATED COUNT: 13.1 % (ref 11.5–14.5)
HCT VFR BLD AUTO: 37.6 % (ref 37–48.5)
HGB BLD-MCNC: 12.4 GM/DL (ref 12–16)
MCH RBC QN AUTO: 31 PG (ref 27–31)
MCHC RBC AUTO-ENTMCNC: 33 G/DL (ref 32–36)
MCV RBC AUTO: 94 FL (ref 82–98)
PLATELET # BLD AUTO: 333 K/UL (ref 150–450)
PMV BLD AUTO: 11.8 FL (ref 9.2–12.9)
RBC # BLD AUTO: 4 M/UL (ref 4–5.4)
T4 FREE SERPL-MCNC: 1.11 NG/DL (ref 0.71–1.51)
TSH SERPL-ACNC: 2.17 UIU/ML (ref 0.4–4)
WBC # BLD AUTO: 6.36 K/UL (ref 3.9–12.7)

## 2025-07-14 PROCEDURE — 84443 ASSAY THYROID STIM HORMONE: CPT

## 2025-07-14 PROCEDURE — 84439 ASSAY OF FREE THYROXINE: CPT

## 2025-07-14 PROCEDURE — 36415 COLL VENOUS BLD VENIPUNCTURE: CPT | Mod: PN

## 2025-07-14 PROCEDURE — 85027 COMPLETE CBC AUTOMATED: CPT

## 2025-07-14 PROCEDURE — 82607 VITAMIN B-12: CPT

## 2025-07-15 LAB — VIT B12 SERPL-MCNC: 532 PG/ML (ref 210–950)
